# Patient Record
Sex: FEMALE | Race: WHITE | Employment: OTHER | URBAN - METROPOLITAN AREA
[De-identification: names, ages, dates, MRNs, and addresses within clinical notes are randomized per-mention and may not be internally consistent; named-entity substitution may affect disease eponyms.]

---

## 2017-06-22 ENCOUNTER — HOSPITAL ENCOUNTER (OUTPATIENT)
Dept: MRI IMAGING | Facility: CLINIC | Age: 82
Discharge: HOME | End: 2017-06-22
Attending: INTERNAL MEDICINE | Admitting: INTERNAL MEDICINE
Payer: MEDICARE

## 2017-06-22 DIAGNOSIS — C50.919 MALIGNANT NEOPLASM OF FEMALE BREAST, UNSPECIFIED LATERALITY, UNSPECIFIED SITE OF BREAST: ICD-10-CM

## 2017-06-22 LAB
CREAT BLD-MCNC: 0.6 MG/DL (ref 0.52–1.04)
GFR SERPL CREATININE-BSD FRML MDRD: >90 ML/MIN/1.7M2

## 2017-06-22 PROCEDURE — 25000128 H RX IP 250 OP 636: Performed by: INTERNAL MEDICINE

## 2017-06-22 PROCEDURE — 0159T MR BREAST BILATERAL W/O & W CONTRAST: CPT | Mod: GA

## 2017-06-22 PROCEDURE — A9585 GADOBUTROL INJECTION: HCPCS | Performed by: INTERNAL MEDICINE

## 2017-06-22 PROCEDURE — 82565 ASSAY OF CREATININE: CPT

## 2017-06-22 PROCEDURE — 27210995 ZZH RX 272: Performed by: INTERNAL MEDICINE

## 2017-06-22 RX ORDER — ACYCLOVIR 200 MG/1
30 CAPSULE ORAL ONCE
Status: COMPLETED | OUTPATIENT
Start: 2017-06-22 | End: 2017-06-22

## 2017-06-22 RX ORDER — GADOBUTROL 604.72 MG/ML
10 INJECTION INTRAVENOUS ONCE
Status: COMPLETED | OUTPATIENT
Start: 2017-06-22 | End: 2017-06-22

## 2017-06-22 RX ADMIN — GADOBUTROL 10 ML: 604.72 INJECTION INTRAVENOUS at 12:56

## 2017-06-22 RX ADMIN — SODIUM CHLORIDE 30 ML: 9 INJECTION, SOLUTION INTRAMUSCULAR; INTRAVENOUS; SUBCUTANEOUS at 12:56

## 2017-06-26 ENCOUNTER — TELEPHONE (OUTPATIENT)
Dept: MAMMOGRAPHY | Facility: CLINIC | Age: 82
End: 2017-06-26

## 2017-06-26 NOTE — TELEPHONE ENCOUNTER
After review by [facility] Radiologist, Dr. Terry Winters, Ms. Watts was called and given her 6/22/2017 Breast MRI results (BI-RADS 2) and recommended Follow up (Annual Screening).  Ms. Watts denies any questions or concerns at this time.  I encouraged her to perform monthly breast self exams and to contact her doctor with any further breast concerns.

## 2017-09-10 ENCOUNTER — APPOINTMENT (OUTPATIENT)
Dept: GENERAL RADIOLOGY | Facility: CLINIC | Age: 82
End: 2017-09-10
Attending: EMERGENCY MEDICINE
Payer: MEDICARE

## 2017-09-10 ENCOUNTER — HOSPITAL ENCOUNTER (EMERGENCY)
Facility: CLINIC | Age: 82
Discharge: HOME | End: 2017-09-10
Attending: EMERGENCY MEDICINE | Admitting: EMERGENCY MEDICINE
Payer: MEDICARE

## 2017-09-10 VITALS
HEART RATE: 72 BPM | TEMPERATURE: 97.4 F | DIASTOLIC BLOOD PRESSURE: 117 MMHG | HEIGHT: 65 IN | WEIGHT: 145 LBS | BODY MASS INDEX: 24.16 KG/M2 | SYSTOLIC BLOOD PRESSURE: 180 MMHG | OXYGEN SATURATION: 97 % | RESPIRATION RATE: 18 BRPM

## 2017-09-10 DIAGNOSIS — I10 ESSENTIAL HYPERTENSION: ICD-10-CM

## 2017-09-10 DIAGNOSIS — F43.21 ADJUSTMENT DISORDER WITH DEPRESSED MOOD: ICD-10-CM

## 2017-09-10 DIAGNOSIS — R68.84 JAW PAIN: ICD-10-CM

## 2017-09-10 LAB
ANION GAP SERPL CALCULATED.3IONS-SCNC: 6 MMOL/L (ref 3–14)
BASOPHILS # BLD AUTO: 0 10E9/L (ref 0–0.2)
BASOPHILS NFR BLD AUTO: 0.6 %
BUN SERPL-MCNC: 14 MG/DL (ref 7–30)
CALCIUM SERPL-MCNC: 8.9 MG/DL (ref 8.5–10.1)
CHLORIDE SERPL-SCNC: 104 MMOL/L (ref 94–109)
CO2 SERPL-SCNC: 30 MMOL/L (ref 20–32)
CREAT SERPL-MCNC: 0.58 MG/DL (ref 0.52–1.04)
DIFFERENTIAL METHOD BLD: NORMAL
EOSINOPHIL # BLD AUTO: 0 10E9/L (ref 0–0.7)
EOSINOPHIL NFR BLD AUTO: 0.8 %
ERYTHROCYTE [DISTWIDTH] IN BLOOD BY AUTOMATED COUNT: 14.1 % (ref 10–15)
GFR SERPL CREATININE-BSD FRML MDRD: >90 ML/MIN/1.7M2
GLUCOSE SERPL-MCNC: 115 MG/DL (ref 70–99)
HCT VFR BLD AUTO: 42.2 % (ref 35–47)
HGB BLD-MCNC: 14.1 G/DL (ref 11.7–15.7)
IMM GRANULOCYTES # BLD: 0 10E9/L (ref 0–0.4)
IMM GRANULOCYTES NFR BLD: 0.2 %
LYMPHOCYTES # BLD AUTO: 1 10E9/L (ref 0.8–5.3)
LYMPHOCYTES NFR BLD AUTO: 19.4 %
MCH RBC QN AUTO: 28.6 PG (ref 26.5–33)
MCHC RBC AUTO-ENTMCNC: 33.4 G/DL (ref 31.5–36.5)
MCV RBC AUTO: 86 FL (ref 78–100)
MONOCYTES # BLD AUTO: 0.4 10E9/L (ref 0–1.3)
MONOCYTES NFR BLD AUTO: 8.4 %
NEUTROPHILS # BLD AUTO: 3.6 10E9/L (ref 1.6–8.3)
NEUTROPHILS NFR BLD AUTO: 70.6 %
NRBC # BLD AUTO: 0 10*3/UL
NRBC BLD AUTO-RTO: 0 /100
PLATELET # BLD AUTO: 260 10E9/L (ref 150–450)
POTASSIUM SERPL-SCNC: 3.6 MMOL/L (ref 3.4–5.3)
RBC # BLD AUTO: 4.93 10E12/L (ref 3.8–5.2)
SODIUM SERPL-SCNC: 140 MMOL/L (ref 133–144)
TROPONIN I SERPL-MCNC: <0.015 UG/L (ref 0–0.04)
WBC # BLD AUTO: 5.1 10E9/L (ref 4–11)

## 2017-09-10 PROCEDURE — 96375 TX/PRO/DX INJ NEW DRUG ADDON: CPT

## 2017-09-10 PROCEDURE — 85025 COMPLETE CBC W/AUTO DIFF WBC: CPT | Performed by: EMERGENCY MEDICINE

## 2017-09-10 PROCEDURE — 84484 ASSAY OF TROPONIN QUANT: CPT | Performed by: EMERGENCY MEDICINE

## 2017-09-10 PROCEDURE — 80048 BASIC METABOLIC PNL TOTAL CA: CPT | Performed by: EMERGENCY MEDICINE

## 2017-09-10 PROCEDURE — 25000128 H RX IP 250 OP 636: Performed by: EMERGENCY MEDICINE

## 2017-09-10 PROCEDURE — 99284 EMERGENCY DEPT VISIT MOD MDM: CPT | Mod: 25

## 2017-09-10 PROCEDURE — 96374 THER/PROPH/DIAG INJ IV PUSH: CPT

## 2017-09-10 PROCEDURE — 71020 XR CHEST 2 VW: CPT

## 2017-09-10 RX ORDER — HYDRALAZINE HYDROCHLORIDE 20 MG/ML
10 INJECTION INTRAMUSCULAR; INTRAVENOUS ONCE
Status: COMPLETED | OUTPATIENT
Start: 2017-09-10 | End: 2017-09-10

## 2017-09-10 RX ORDER — METOCLOPRAMIDE HYDROCHLORIDE 5 MG/ML
5 INJECTION INTRAMUSCULAR; INTRAVENOUS ONCE
Status: COMPLETED | OUTPATIENT
Start: 2017-09-10 | End: 2017-09-10

## 2017-09-10 RX ORDER — METOCLOPRAMIDE 5 MG/1
5 TABLET ORAL 3 TIMES DAILY PRN
Qty: 20 TABLET | Refills: 0 | Status: SHIPPED | OUTPATIENT
Start: 2017-09-10 | End: 2021-06-04

## 2017-09-10 RX ADMIN — HYDRALAZINE HYDROCHLORIDE 10 MG: 20 INJECTION INTRAMUSCULAR; INTRAVENOUS at 10:26

## 2017-09-10 RX ADMIN — METOCLOPRAMIDE 5 MG: 5 INJECTION, SOLUTION INTRAMUSCULAR; INTRAVENOUS at 11:14

## 2017-09-10 ASSESSMENT — ENCOUNTER SYMPTOMS
FEVER: 0
WOUND: 0
VOMITING: 0
SHORTNESS OF BREATH: 1
ABDOMINAL PAIN: 1
HEMATURIA: 0
DYSURIA: 0
DIARRHEA: 0
NAUSEA: 1
FREQUENCY: 1

## 2017-09-10 NOTE — DISCHARGE INSTRUCTIONS
Grief Reaction  Grief is the feeling that we all have when we lose someone or something that has been important in our life. Grief is an unavoidable and normal reaction to this loss, and can last from months to years. The amount of time depends on different factors. These include how close the person was to you, and how much support you have through the grief process. Symptoms can be both physical and emotional.  Physical reactions:    Loss of appetite or overeating    Changes in weight    Trouble getting to sleep or staying asleep    Hair loss    Upset stomach, indigestion, heart burn, abdominal pain, cramping, diarrhea    Sense of trouble breathing    Trembling, shakiness  Emotional reactions:    Sadness    Anxiety    Feeling depressed or helpless    Difficulty concentrating    Detachment or withdrawal from those around you    Loss of interest in your normal life and work  Home care    Allow yourself to feel the pain of your loss. For some, this can be a key part of healing grief. Talk about your pain with others who understand. Share good memories that involve the person, pet, or possession  you lost.    Take time for yourself. Make it a point to do things that you enjoy (gardening, walking in nature, going to a movie, etc.).    Take care of your physical body. Eat a balanced diet (low in saturated fat and high in fruits and vegetables) and establish an exercise plan at least 3 times a week for 30 minutes. Even mild-moderate exercise (like brisk walking) can make you feel better. Get plenty of sleep.    Avoid the use of alcohol and drugs to cover your emotional pain. This only slows down the emotional healing process.    Do not isolate yourself from others. Have daily contact with family or friends. Talk about your loss to those closest to you.    For additional support, meet with your //rabbi, a counselor or therapist, or your own doctor.    Consider joining a grief support group. Ask your doctor  or our staff for information on how to find one in your area.    If you have been prescribed a medicine to help with your symptoms, take it only as directed. Do not use it with alcohol.  Follow-up care  Follow up with your healthcare provider, or as advised.  Call 911  Call 911 if any of these occur:    Trouble breathing    Very confused    Very drowsy or trouble awakening    Fainting or loss of consciousness    Rapid heart rate    Seizure    New chest pain that becomes more severe, lasts longer, or spreads into your shoulder, arm, neck, jaw or back  When to seek medical advice  Call your healthcare provider right away if any of these occur:    Worsening symptoms    Unable to eat or sleep for three days in a row    Feeling extreme depression, fear, anxiety, or anger toward yourself or others    Feeling out of control    Feeling that you may try to harm yourself    family or friends express concern over your behavior and ask you to get help  Date Last Reviewed: 9/29/2015 2000-2017 [employer]. [address] [place] [address]. All rights reserved. This information is not intended as a substitute for professional medical care. Always follow your healthcare professional's instructions.          Acute Pain, Uncertain Cause  Pain can be caused by many conditions that range from very minor to very serious. In some cases, though, pain comes and goes with no apparent cause.  We were not able to find the exact cause for your pain. At this time there is no sign of any serious illness causing your pain. More tests may be needed to determine the cause. In many cases, pain like this goes away by itself.  Home care  Take any medicines as prescribed. If another medicine was not prescribed for pain, you can take an over-the-counter pain medicine such as ibuprofen or acetaminophen. Use these as directed on the label.    Follow-up care  Follow up with your healthcare provider or our staff as directed.  When  to seek medical advice  Call your healthcare provider for any of the following:    Pain changes in pattern    Pain doesn't lessen or gets worse    New symptoms appear    Fever of 100.4 F (38 C) or higher, or as directed by your healthcare provider  Date Last Reviewed: 7/26/2015 2000-2017 [employer]. [address] [place] [address], [place] [address]. All rights reserved. This information is not intended as a substitute for professional medical care. Always follow your healthcare professional's instructions.

## 2017-09-10 NOTE — ED AVS SNAPSHOT
Emergency Department    [address] [facility] [address]    Phone:  [phone]    Fax:  [phone]                                       Nelda Watts   MRN: [id number]    Department:   Emergency Department   Date of Visit:  9/10/2017           Patient Information     Date Of Birth          1/29/1933        Your diagnoses for this visit were:     Jaw pain     Adjustment disorder with depressed mood     Essential hypertension        You were seen by Puneet Feliciano MD.      Follow-up Information     Follow up with Ryland Torres MD In 1 day.    Specialty:  Internal Medicine    Contact information:    [facility]  [address]  [facility] [address]  [phone]          Follow up with  Emergency Department Today.    Specialty:  EMERGENCY MEDICINE    Why:  If symptoms worsen    Contact information:    [address] [facility] [id number] [phone]        Discharge Instructions         Grief Reaction  Grief is the feeling that we all have when we lose someone or something that has been important in our life. Grief is an unavoidable and normal reaction to this loss, and can last from months to years. The amount of time depends on different factors. These include how close the person was to you, and how much support you have through the grief process. Symptoms can be both physical and emotional.  Physical reactions:    Loss of appetite or overeating    Changes in weight    Trouble getting to sleep or staying asleep    Hair loss    Upset stomach, indigestion, heart burn, abdominal pain, cramping, diarrhea    Sense of trouble breathing    Trembling, shakiness  Emotional reactions:    Sadness    Anxiety    Feeling depressed or helpless    Difficulty concentrating    Detachment or withdrawal from those around you    Loss of interest in your normal life and work  Home care    Allow yourself to feel the pain of your loss. For some, this can be a key part of  healing grief. Talk about your pain with others who understand. Share good memories that involve the person, pet, or possession  you lost.    Take time for yourself. Make it a point to do things that you enjoy (gardening, walking in nature, going to a movie, etc.).    Take care of your physical body. Eat a balanced diet (low in saturated fat and high in fruits and vegetables) and establish an exercise plan at least 3 times a week for 30 minutes. Even mild-moderate exercise (like brisk walking) can make you feel better. Get plenty of sleep.    Avoid the use of alcohol and drugs to cover your emotional pain. This only slows down the emotional healing process.    Do not isolate yourself from others. Have daily contact with family or friends. Talk about your loss to those closest to you.    For additional support, meet with your //rabbi, a counselor or therapist, or your own doctor.    Consider joining a grief support group. Ask your doctor or our staff for information on how to find one in your area.    If you have been prescribed a medicine to help with your symptoms, take it only as directed. Do not use it with alcohol.  Follow-up care  Follow up with your healthcare provider, or as advised.  Call 911  Call 911 if any of these occur:    Trouble breathing    Very confused    Very drowsy or trouble awakening    Fainting or loss of consciousness    Rapid heart rate    Seizure    New chest pain that becomes more severe, lasts longer, or spreads into your shoulder, arm, neck, jaw or back  When to seek medical advice  Call your healthcare provider right away if any of these occur:    Worsening symptoms    Unable to eat or sleep for three days in a row    Feeling extreme depression, fear, anxiety, or anger toward yourself or others    Feeling out of control    Feeling that you may try to harm yourself    family or friends express concern over your behavior and ask you to get help  Date Last Reviewed:  9/29/2015 2000-2017 [employer]. [address] [place] [address]. All rights reserved. This information is not intended as a substitute for professional medical care. Always follow your healthcare professional's instructions.          Acute Pain, Uncertain Cause  Pain can be caused by many conditions that range from very minor to very serious. In some cases, though, pain comes and goes with no apparent cause.  We were not able to find the exact cause for your pain. At this time there is no sign of any serious illness causing your pain. More tests may be needed to determine the cause. In many cases, pain like this goes away by itself.  Home care  Take any medicines as prescribed. If another medicine was not prescribed for pain, you can take an over-the-counter pain medicine such as ibuprofen or acetaminophen. Use these as directed on the label.    Follow-up care  Follow up with your healthcare provider or our staff as directed.  When to seek medical advice  Call your healthcare provider for any of the following:    Pain changes in pattern    Pain doesn't lessen or gets worse    New symptoms appear    Fever of 100.4 F (38 C) or higher, or as directed by your healthcare provider  Date Last Reviewed: 7/26/2015 2000-2017 [employer]. [address] [place] [address]. All rights reserved. This information is not intended as a substitute for professional medical care. Always follow your healthcare professional's instructions.          24 Hour Appointment Hotline       To make an appointment at any [facility], call [phone] ([phone]). If you don't have a family doctor or clinic, we will help you find one. [facility] are conveniently located to serve the needs of you and your family.             Review of your medicines      START taking        Dose / Directions Last dose taken    metoclopramide 5 MG tablet   Commonly known as:  REGLAN   Dose:  5 mg    Quantity:  20 tablet        Take 1 tablet (5 mg) by mouth 3 times daily as needed   Refills:  0          Our records show that you are taking the medicines listed below. If these are incorrect, please call your family doctor or clinic.        Dose / Directions Last dose taken    acetaminophen 650 MG 8 hour tablet   Dose:  650 mg   Quantity:  100 tablet        Take 650 mg by mouth every 6 hours   Refills:  0        ALLEGRA PO   Dose:  60 mg        Take 60 mg by mouth daily Breaks tab in 1/2 and takes 1/2 tab twice daily of single daily dose tab.   Refills:  0        B-12 PO   Dose:  1000 mg        Take 1,000 mg by mouth every evening Patient does not know strength   Refills:  0        calcium-vitamin D 600-400 MG-UNIT per tablet   Commonly known as:  CALTRATE   Dose:  1 tablet        Take 1 tablet by mouth 2 times daily   Refills:  0        CLONAZEPAM PO   Dose:  0.5 mg        Take 0.5 mg by mouth daily as needed for anxiety   Refills:  0        COENZYME Q-10 PO   Dose:  200 mg        Take 200 mg by mouth daily   Refills:  0        COUMADIN PO   Dose:  4 mg        Take 4 mg by mouth daily   Refills:  0        cyanocobalamin 1000 MCG/ML injection   Commonly known as:  VITAMIN B12   Dose:  1 mL        Inject 1 mL into the muscle every 30 days Patient receives on the first week of each month   Refills:  0        LACTOBACILLUS PO   Dose:  1 capsule        Take 1 capsule by mouth 2 times daily   Refills:  0        MAGNESIUM OXIDE PO   Dose:  500 mg        Take 500 mg by mouth At Bedtime   Refills:  0        MELATONIN PO   Dose:  3 mg        Take 3 mg by mouth At Bedtime   Refills:  0        Multi-vitamin Tabs tablet   Dose:  1 tablet        Take 1 tablet by mouth daily   Refills:  0        OMEPRAZOLE PO   Dose:  20 mg        Take 20 mg by mouth 2 times daily (before meals)   Refills:  0        oxyCODONE 5 MG IR tablet   Commonly known as:  ROXICODONE   Dose:  5 mg   Quantity:  100 tablet        Take 1 tablet (5 mg) by  mouth every 3 hours as needed for moderate to severe pain   Refills:  0        prenatal multivitamin plus iron 27-0.8 MG Tabs per tablet   Dose:  1 tablet        Take 1 tablet by mouth daily   Refills:  0        PROPRANOLOL HCL PO   Dose:  40 mg        Take 40 mg by mouth 4 times daily   Refills:  0        sennosides 8.6 MG tablet   Commonly known as:  SENOKOT   Dose:  1 tablet   Indication:  Constipation        Take 1 tablet by mouth 2 times daily   Refills:  0        ZOFRAN PO   Dose:  4 mg        Take 4 mg by mouth every 6 hours as needed for nausea or vomiting   Refills:  0                Prescriptions were sent or printed at these locations (1 Prescription)                   Other Prescriptions                Printed at Department/Unit printer (1 of 1)         metoclopramide (REGLAN) 5 MG tablet                Procedures and tests performed during your visit     Basic metabolic panel    CBC with platelets differential    Chest XR,  PA & LAT    Troponin I      Orders Needing Specimen Collection     None      Pending Results     No orders found from 9/8/2017 to 9/11/2017.            Pending Culture Results     No orders found from 9/8/2017 to 9/11/2017.            Pending Results Instructions     If you had any lab results that were not finalized at the time of your Discharge, you can call the ED Lab Result RN at [phone]. You will be contacted by this team for any positive Lab results or changes in treatment. The nurses are available 7 days a week from 10A to 6:30P.  You can leave a message 24 hours per day and they will return your call.        Test Results From Your Hospital Stay        9/10/2017  9:51 AM      Component Results     Component Value Ref Range & Units Status    WBC 5.1 4.0 - 11.0 10e9/L Final    RBC Count 4.93 3.8 - 5.2 10e12/L Final    Hemoglobin 14.1 11.7 - 15.7 g/dL Final    Hematocrit 42.2 35.0 - 47.0 % Final    MCV 86 78 - 100 fl Final    MCH 28.6 26.5 - 33.0 pg Final    MCHC 33.4 31.5 -  36.5 g/dL Final    RDW 14.1 10.0 - 15.0 % Final    Platelet Count 260 150 - 450 10e9/L Final    Diff Method Automated Method  Final    % Neutrophils 70.6 % Final    % Lymphocytes 19.4 % Final    % Monocytes 8.4 % Final    % Eosinophils 0.8 % Final    % Basophils 0.6 % Final    % Immature Granulocytes 0.2 % Final    Nucleated RBCs 0 0 /100 Final    Absolute Neutrophil 3.6 1.6 - 8.3 10e9/L Final    Absolute Lymphocytes 1.0 0.8 - 5.3 10e9/L Final    Absolute Monocytes 0.4 0.0 - 1.3 10e9/L Final    Absolute Eosinophils 0.0 0.0 - 0.7 10e9/L Final    Absolute Basophils 0.0 0.0 - 0.2 10e9/L Final    Abs Immature Granulocytes 0.0 0 - 0.4 10e9/L Final    Absolute Nucleated RBC 0.0  Final         9/10/2017 10:05 AM      Component Results     Component Value Ref Range & Units Status    Sodium 140 133 - 144 mmol/L Final    Potassium 3.6 3.4 - 5.3 mmol/L Final    Chloride 104 94 - 109 mmol/L Final    Carbon Dioxide 30 20 - 32 mmol/L Final    Anion Gap 6 3 - 14 mmol/L Final    Glucose 115 (H) 70 - 99 mg/dL Final    Urea Nitrogen 14 7 - 30 mg/dL Final    Creatinine 0.58 0.52 - 1.04 mg/dL Final    GFR Estimate >90 >60 mL/min/1.7m2 Final    Non  GFR Calc    GFR Estimate If Black >90 >60 mL/min/1.7m2 Final    African American GFR Calc    Calcium 8.9 8.5 - 10.1 mg/dL Final         9/10/2017 10:09 AM      Component Results     Component Value Ref Range & Units Status    Troponin I ES <0.015 0.000 - 0.045 ug/L Final    The 99th percentile for upper reference range is 0.045 ug/L.  Troponin values   in the range of 0.045 - 0.120 ug/L may be associated with risks of adverse   clinical events.           9/10/2017 10:31 AM      Narrative     CHEST TWO VIEWS  9/10/2017 10:23 AM     HISTORY: Chest pain.    COMPARISON: None.        Impression     IMPRESSION: No significant interval change. Hyperinflation both lungs.  The lungs are otherwise clear. Cardiac enlargement. Pulmonary  vascularity is within normal limits. Aortic  calcification. No pleural  effusions. Left shoulder arthroplasty.    FARIDA VELAZQUEZ MD                Clinical Quality Measure: Blood Pressure Screening     Your blood pressure was checked while you were in the emergency department today. The last reading we obtained was  BP: (!) 159/103 . Please read the guidelines below about what these numbers mean and what you should do about them.  If your systolic blood pressure (the top number) is less than 120 and your diastolic blood pressure (the bottom number) is less than 80, then your blood pressure is normal. There is nothing more that you need to do about it.  If your systolic blood pressure (the top number) is 120-139 or your diastolic blood pressure (the bottom number) is 80-89, your blood pressure may be higher than it should be. You should have your blood pressure rechecked within a year by a primary care provider.  If your systolic blood pressure (the top number) is 140 or greater or your diastolic blood pressure (the bottom number) is 90 or greater, you may have high blood pressure. High blood pressure is treatable, but if left untreated over time it can put you at risk for heart attack, stroke, or kidney failure. You should have your blood pressure rechecked by a primary care provider within the next 4 weeks.  If your provider in the emergency department today gave you specific instructions to follow-up with your doctor or provider even sooner than that, you should follow that instruction and not wait for up to 4 weeks for your follow-up visit.        Thank you for choosing [facility]       Thank you for choosing [facility] for your care. Our goal is always to provide you with excellent care. Hearing back from our patients is one way we can continue to improve our services. Please take a few minutes to complete the written survey that you may receive in the mail after you visit with us. Thank you!        [facility] [facility] Information     [email or web address] lets you send messages to  "your doctor, view your test results, renew your prescriptions, schedule appointments and more. To sign up, go to [email or web address] . Click on \"Log in\" on the left side of the screen, which will take you to the Welcome page. Then click on \"Sign up Now\" on the right side of the page.     You will be asked to enter the access code listed below, as well as some personal information. Please follow the directions to create your username and password.     Your access code is: [email or web address]  Expires: 2017 11:31 AM     Your access code will  in 90 days. If you need help or a new code, please call your [facility] clinic or [phone].        Care EveryWhere ID     This is your Care EveryWhere ID. This could be used by other organizations to access your [facility] medical records  [id number]        Equal Access to Services     AUBREY CRAMER : Hadii martha Ayala, wapage bauer, geremias kaalmatristan lewis, aram arias . So [facility] [phone].    ATENCIÓN: Si habla español, tiene a albright disposición servicios gratuitos de asistencia lingüística. Llame al [phone].    We comply with applicable federal civil rights laws and [place] laws. We do not discriminate on the basis of race, color, national origin, age, disability sex, sexual orientation or gender identity.            After Visit Summary       This is your record. Keep this with you and show to your community pharmacist(s) and doctor(s) at your next visit.                  "

## 2017-09-10 NOTE — ED AVS SNAPSHOT
Emergency Department    [address] [place] [address]    Phone:  [phone]    Fax:  [phone]                                       Nelda Watts   MRN: [id number]    Department:   Emergency Department   Date of Visit:  9/10/2017           After Visit Summary Signature Page     I have received my discharge instructions, and my questions have been answered. I have discussed any challenges I see with this plan with the nurse or doctor.    ..........................................................................................................................................  Patient/Patient Representative Signature      ..........................................................................................................................................  Patient Representative Print Name and Relationship to Patient    ..................................................               ................................................  Date                                            Time    ..........................................................................................................................................  Reviewed by Signature/Title    ...................................................              ..............................................  Date                                                            Time

## 2017-09-10 NOTE — ED PROVIDER NOTES
History     Chief Complaint:  Jaw Pain and Nausea      HPI   Nelda Watts is a 84 year old female with a history of Strokes, Atrial Fibrillation Hypertension and Gastroparesis who presents with jaw pain and nausea. The patient reports that on Thursday she was seen at [facility] and was treated for a UTI with 500 mg of Keflex because of her symptoms of frequency and nausea. She reports that her symptoms of nausea (not associated with any food) and slight shortness of breath that is more severe than her baseline both began on Thursday (3 days ago).  She reports that she has been feeling intermittent left-sided jaw pain that radiates into her ear (not into her head or neck) since yesterday that comes on 3x/hour and only lasts for seconds. She states that she was in bed when the pain initially came on and took Tylenol for the pain but experienced no relief. She additionally notes slight abdominal pain, not being able to sleep well, chronic energy loss and dry mouth that comes from the medications that she takes. The patient's daughter reports that the patient's blood pressure is typically 138/95 at home. The patient denies chest pain, swelling in her legs, vomiting, fever, or any recent trauma. She states that she was at the dentist two weeks ago and had a filling in an upper-left tooth but has not had problems since the dental visit. She also denies bowel or urinary problems (aside from frequency, urinates every 30 minutes). Of note, the patient's  recently  in  (2 months ago) but lives at [facility] with other elderly people. Also of note, the patient ambulates with a walker at baseline. Of note, the patient had stopped taking Neurontin until recently, just starting back over the last one or two days because of a miscommunication between her PCP and her Orthopedic doctor.    Allergies:  Anastrozole  Citalopram  Clonidine  Contrast Dye  Hydralazine  Nifedipine  Sulfa  drugs  Tetanus immune globulin  Vasotec    Medications:    Neurontin  Coumadin  Senokot  Zofran  Roxicodone  Acetaminophen  Lactobacillus  Prenatal multivitamin  Coenzyme Q-10  Caltrate  Clonazepam  Cyanocobalamin  Allegra  Magnesium oxide  Melatonin  Omeprazole  Propranolol    Past Medical History:    Chronic atrial fibrillation  Cerebrovascular disease  Right knee DJD  Nausea and vomiting  GI Bleed  Diverticulitis  Iamloc prim osteoart- left leg  Localized osteoarthritis, shoulder region  Anxiety and depression  Arrhythmia  Blastocystis hominis infection  Cholelithiasis  Cysts  Ductal carcinoma  Essential tremor (neck)  Gastro Esophageal Reflux Disease  Hypertension  Hypothyroidism  Impaired fasting glucose  Myalgia and myositis, unspecified  Scoliosis  Stroke  Suprasellar mass  Unspecified diastolic heart failure  Uterine fibroid  Vitamin B12 deficiency  Breast cancer    Past Surgical History:    Appendectomy  Arthroplasty knee  Biopsy- breast, muscle  Breast surgery- right lumpectomy 20 Dec 2013  Hematoma evacuation  GI surgery  Salpingectomy  Bunionectomy  Cyst removal from tow  Vein stripping    Family History:    History reviewed. No significant family history.     Social History:  Relationship status:  (recent)  Tobacco Use: Neg  Alcohol Use: Pos (3.5 oz/week)  The patient presents with her daughter.  The patient is retired.  The patient uses a walker to ambulate.   The patient lives in a living facility.    Review of Systems   Constitutional: Negative for fever.   HENT:        Positive for jaw pain.   Respiratory: Positive for shortness of breath.    Cardiovascular: Negative for chest pain and leg swelling.   Gastrointestinal: Positive for abdominal pain and nausea. Negative for diarrhea and vomiting.   Genitourinary: Positive for frequency. Negative for dysuria, hematuria and urgency.   Skin: Negative for wound.   All other systems reviewed and are negative.    Physical Exam     Patient Vitals for  "the past 24 hrs:   BP Temp Temp src Pulse Heart Rate Resp SpO2 Height Weight   09/10/17 1159 - - - - - 18 - - -   09/10/17 1130 (!) 180/117 - - - 84 13 97 % - -   09/10/17 1115 (!) 169/111 - - - 67 13 97 % - -   09/10/17 1100 (!) 159/103 - - - - - - - -   09/10/17 1045 158/81 - - - - - - - -   09/10/17 1030 (!) 163/118 - - - - - - - -   09/10/17 1027 (!) 187/117 - - - - - - - -   09/10/17 0931 (!) 202/134 - - - - - 96 % - -   09/10/17 0920 (!) 200/124 97.4  F (36.3  C) Oral 72 72 16 96 % 1.651 m (5' 5\") 65.8 kg (145 lb)     Physical Exam  General: Alert and Interactive.   Head: No signs of trauma.   Mouth/Throat: Oropharynx is clear and moist.   Eyes: Conjunctivae are normal. Pupils are equal, round, and reactive to light.   Neck: Normal range of motion. No nuchal rigidity. Tremor present.  CV: Irregularly irregular.  Resp: Effort normal and breath sounds normal. No respiratory distress.   GI: Soft. There is no tenderness or guarding.   MSK: Normal range of motion. no edema.   Neuro: The patient is alert and oriented to person, place, and time.  PERRLA, EOMI, strength in upper/lower extremities normal and symmetrical.   Sensation normal. Speech normal.  GCS eye subscore is 4. GCS verbal subscore is 5. GCS motor subscore is 6.   Skin: Skin is warm and dry. No rash noted.   Psych: normal mood and affect. behavior is normal.     Emergency Department Course   ECG @ 0921  Indication: Jaw Pain and Nausea  Rate 76 bpm.   ID interval * ms.   QRS duration 104 ms.   QT/QTc 416/468 ms.   P-R-T axes -41.  Notes: Atrial Fibrillation, Left axis deviation, Voltage criteria for left ventricular hypertrophy, Anteroseptal infarct- age undetermined, Abnormal ECG. Unchanged compared to ECG done 12/5/2014  Time read 0925     Imaging:  Radiographic findings were communicated with the patient and family who voiced understanding of the findings.    Chest XR, PA & LAT per radiology:  No significant interval change. Hyperinflation both " "lungs. The lungs are otherwise clear. Cardiac enlargement. Pulmonary vascularity is within normal limits. Aortic calcification. No pleural effusions. Left shoulder arthroplasty.    Laboratory:  CBC: WNL (WBC 5.1, HGB 14.1, )   BMP: Glucose 115 (H) o/w WNL (Creatinine 0.58)   0930: Troponin I: <0.015    Interventions:  1026: Apresoline, 10 mg, IV  1114: Reglan, 5 mg, IV    Emergency Department Course:  Nursing notes and vitals reviewed.  I performed an exam of the patient as documented above.  The above workup was undertaken.  1100: I rechecked the patient and discussed results.  Findings and plan explained to the Patient and her daughter. Patient discharged home, status improved, with instructions regarding supportive care, medications, and reasons to return as well as the importance of close follow-up was reviewed.    Impression & Plan    Medical Decision Making:  Nelda Watts is a 84 year old female who presents to the ER with a complicated medical history and a recent grief reaction, it sounds as though she is having some adjustment disorder-type symptoms related to the grief of losing her  after being  for 65 years.   The patient does not eat, she is experiencing pain in her jaw which sounds to be more consistent with trigeminal neuralgia than with cardiac disease or trauma. The patient is significantly hypertensive but she is anxious. She is depressed because of her recent loss and she states she wishes she could die but she would \"never do that to her children\". The patient would qualify for a 72 hour hold. She is comfortable going home, she would like to go home but she knows she has to work through some of her grief before things will get better. The patient had stopped taking Neurontin until recently just starting back over the last one or two days, this may be contributing to the nerve-like pain in the jaw. She did complain of nausea for which she was treated with Reglan here, it " did seem to improve her symptoms so she will be given a small amount of the medication to be used as a PRN at home.    Diagnosis:    ICD-10-CM   1. Jaw pain R68.84   2. Adjustment disorder with depressed mood F43.21   3. Essential hypertension I10     Disposition:  Discharged to home with Reglan.    Discharge Medications:  metoclopramide (REGLAN) 5 MG tablet Take 1 tablet (5 mg) by mouth 3 times daily as needed, Disp-20 tablet, R-0, Local Print     I, Patricia Mendiola, am serving as a scribe on 9/10/2017 at 9:36 AM to personally document services performed by Puneet Feliciano MD, based on my observations and the provider's statements to me.     EMERGENCY DEPARTMENT       Puneet Feliciano MD  09/10/17 0339

## 2017-12-11 ENCOUNTER — HOSPITAL ENCOUNTER (OUTPATIENT)
Dept: GENERAL RADIOLOGY | Facility: CLINIC | Age: 82
Discharge: HOME | End: 2017-12-11
Attending: INTERNAL MEDICINE | Admitting: INTERNAL MEDICINE
Payer: MEDICARE

## 2017-12-11 DIAGNOSIS — R11.0 NAUSEA: ICD-10-CM

## 2017-12-11 PROCEDURE — 25500045 ZZH RX 255: Performed by: RADIOLOGY

## 2017-12-11 PROCEDURE — 74240 X-RAY XM UPR GI TRC 1CNTRST: CPT

## 2017-12-11 RX ADMIN — ANTACID/ANTIFLATULENT 4 G: 380; 550; 10; 10 GRANULE, EFFERVESCENT ORAL at 10:55

## 2018-01-24 ENCOUNTER — HOSPITAL ENCOUNTER (OUTPATIENT)
Dept: MAMMOGRAPHY | Facility: CLINIC | Age: 83
Discharge: HOME | End: 2018-01-24
Attending: INTERNAL MEDICINE | Admitting: INTERNAL MEDICINE
Payer: MEDICARE

## 2018-01-24 DIAGNOSIS — Z12.31 VISIT FOR SCREENING MAMMOGRAM: ICD-10-CM

## 2018-01-24 PROCEDURE — 77067 SCR MAMMO BI INCL CAD: CPT

## 2018-07-12 ENCOUNTER — OFFICE VISIT (OUTPATIENT)
Dept: FAMILY MEDICINE | Facility: CLINIC | Age: 83
End: 2018-07-12
Payer: COMMERCIAL

## 2018-07-12 ENCOUNTER — TELEPHONE (OUTPATIENT)
Dept: FAMILY MEDICINE | Facility: CLINIC | Age: 83
End: 2018-07-12

## 2018-07-12 VITALS — OXYGEN SATURATION: 97 % | DIASTOLIC BLOOD PRESSURE: 134 MMHG | SYSTOLIC BLOOD PRESSURE: 221 MMHG | HEART RATE: 77 BPM

## 2018-07-12 DIAGNOSIS — Z85.820 HISTORY OF MELANOMA: Primary | ICD-10-CM

## 2018-07-12 DIAGNOSIS — D22.9 MULTIPLE NEVI: ICD-10-CM

## 2018-07-12 DIAGNOSIS — L81.4 LENTIGINES: ICD-10-CM

## 2018-07-12 DIAGNOSIS — Z85.828 HISTORY OF BASAL CELL CARCINOMA: ICD-10-CM

## 2018-07-12 DIAGNOSIS — D18.01 CHERRY ANGIOMA: ICD-10-CM

## 2018-07-12 DIAGNOSIS — L82.1 SEBORRHEIC KERATOSES: ICD-10-CM

## 2018-07-12 DIAGNOSIS — L82.0 INFLAMED SEBORRHEIC KERATOSIS: ICD-10-CM

## 2018-07-12 PROCEDURE — 17110 DESTRUCTION B9 LES UP TO 14: CPT | Performed by: PHYSICIAN ASSISTANT

## 2018-07-12 PROCEDURE — 99214 OFFICE O/P EST MOD 30 MIN: CPT | Mod: 25 | Performed by: PHYSICIAN ASSISTANT

## 2018-07-12 NOTE — NURSING NOTE
Emerald Velasco MD - 05/17/2018 8:23 AM CDT  Medical records received from 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

## 2018-07-12 NOTE — LETTER
7/12/2018         RE: Nelda Watts  [address]  [place] [address]        Dear Colleague,    Thank you for referring your patient, Nelda Watts, to the [facility]. Please see a copy of my visit note below.    HPI:  Nelda Watts is a 85 year old year old female patient here today for FBE. History of LM, BCC, atypical nevi. Has a new spot on left arm   Duration: months  Symptoms:  Growing and itching     Previous treatments: none     Alleviating/aggravating factors: none    Associated symptoms: none  Additional findings:none  Patient has no other skin complaints today.  Remainder of the HPI, Meds, PMH, Allergies, FH, and SH was reviewed in chart.      Past Medical History:   Diagnosis Date     Anxiety and depression      Arrhythmia     AF     Atrial fibrillation (H)      Blastocystis hominis infection      Cholelithiasis      Cysts     in liver     Diverticulitis      Ductal carcinoma (H)     infiltrating ductal carcinoma right breast     Essential tremor      Gastro-oesophageal reflux disease      Hypertension      Hypothyroidism (acquired)      IFG (impaired fasting glucose)      Myalgia and myositis, unspecified      Nausea     chronic     OA (osteoarthritis)      Scoliosis      Stroke (H) april 2011    has had two strokes, last in april 2011, in which she started on COUMADON     Suprasellar mass      Unspecified diastolic heart failure      Uterine fibroid      Vitamin B12 deficiency        Past Surgical History:   Procedure Laterality Date     APPENDECTOMY       ARTHROPLASTY KNEE Right 9/28/2015    Procedure: ARTHROPLASTY KNEE;  Surgeon: Lucius Smith MD;  Location: [facility] OR     BIOPSY  1956,1997    breast     BIOPSY  1999    muscle     BREAST SURGERY      right lumpectomy 20 DEC 13     BREAST SURGERY Right     hematoma evacuation     GI SURGERY       GYN SURGERY Bilateral     salpingectomy     GYN SURGERY  1963    hysterectomy     ORTHOPEDIC SURGERY       bunionectomy     ORTHOPEDIC SURGERY      cyst removal from toe     VASCULAR SURGERY  1980,2004    vein stripping        No family history on file.    Social History     Social History     Marital status:      Spouse name: N/A     Number of children: N/A     Years of education: N/A     Occupational History     Not on file.     Social History Main Topics     Smoking status: Never Smoker     Smokeless tobacco: Never Used     Alcohol use 3.5 oz/week     7 Glasses of wine per week     Drug use: No     Sexual activity: Not on file     Other Topics Concern     Not on file     Social History Narrative       Outpatient Encounter Prescriptions as of 7/12/2018   Medication Sig Dispense Refill     acetaminophen 650 MG TABS Take 650 mg by mouth every 6 hours 100 tablet      calcium-vitamin D (CALTRATE) 600-400 MG-UNIT per tablet Take 1 tablet by mouth 2 times daily        CLONAZEPAM PO Take 0.5 mg by mouth daily as needed for anxiety       COENZYME Q-10 PO Take 200 mg by mouth daily       Cyanocobalamin (B-12 PO) Take 1,000 mg by mouth every evening Patient does not know strength       cyanocobalamin 1000 MCG/ML injection Inject 1 mL into the muscle every 30 days Patient receives on the first week of each month       Fexofenadine HCl (ALLEGRA PO) Take 60 mg by mouth daily Breaks tab in 1/2 and takes 1/2 tab twice daily of single daily dose tab.       LACTOBACILLUS PO Take 1 capsule by mouth 2 times daily       MAGNESIUM OXIDE PO Take 500 mg by mouth At Bedtime        MELATONIN PO Take 3 mg by mouth At Bedtime        metoclopramide (REGLAN) 5 MG tablet Take 1 tablet (5 mg) by mouth 3 times daily as needed 20 tablet 0     multivitamin, therapeutic with minerals (MULTI-VITAMIN) TABS Take 1 tablet by mouth daily       OMEPRAZOLE PO Take 20 mg by mouth 2 times daily (before meals)       Ondansetron HCl (ZOFRAN PO) Take 4 mg by mouth every 6 hours as needed for nausea or vomiting       oxyCODONE (ROXICODONE) 5 MG immediate  release tablet Take 1 tablet (5 mg) by mouth every 3 hours as needed for moderate to severe pain 100 tablet 0     Prenatal Vit-Fe Fumarate-FA (PRENATAL MULTIVITAMIN  PLUS IRON) 27-0.8 MG TABS Take 1 tablet by mouth daily       PROPRANOLOL HCL PO Take 40 mg by mouth 4 times daily        sennosides (SENOKOT) 8.6 MG tablet Take 1 tablet by mouth 2 times daily       Warfarin Sodium (COUMADIN PO) Take 4 mg by mouth daily        No facility-administered encounter medications on file as of 7/12/2018.        Review Of Systems:  Skin: As above  Eyes: negative  Ears/Nose/Throat: negative  Respiratory: No shortness of breath, dyspnea on exertion, cough, or hemoptysis  Cardiovascular: negative  Gastrointestinal: negative  Genitourinary: negative  Musculoskeletal: negative  Neurologic: negative  Psychiatric: negative  Hematologic/Lymphatic/Immunologic: negative  Endocrine: negative      Objective:     BP (!) 221/134  Pulse 77  SpO2 97%  Eyes: Conjunctivae/lids: Normal   ENT: Lips:  Normal  MSK: Normal  Cardiovascular: Peripheral edema none  Pulm: Breathing Normal  Neuro/Psych: Orientation: Normal; Mood/Affect: Normal, NAD, WDWN  Following areas examined: Lymph Nodes: No Head and Neck Lymphadenopathy   Following lymph nodes palpated: Occipital, Cervical, Supraclavicular and axillae  Scalp, face, eyelids, lips, neck, chest, abdomen, back, buttocks, and R&L upper and lower extremities.      Elkins skin type:ll   Findings:    1) Cherry angiomas on trunk and extremities.  2) Seborrheic keratoses on trunk and extremities.  3) Benign nevi on trunk and extremities.  4) Lentigines on face, neck, trunk, and extremities.  5) Inflamed brown, stuck-on scaly appearing papules on left arm and left inframammary fold.           Assessment and Plan:  1) Red smooth well-defined macules on trunk and extremities.  2) Brown, stuck-on scaly appearing papules on trunk and extremities.  3) Well circumscribed macules with symmetric color  distribution on trunk and extremities.  4) Vences WD smooth macules on face, neck, trunk, and extremities.  I discussed the specifics of tumor, prognosis, and genetics of benign lesions.  I explained that treatment of these lesions would be purely cosmetic and not medically neccessary.  I discussed with patient different removal options including excision, cryotherapy, cautery and /or laser.      5) ISK x 2  LN2: Treated with LN2 for 5s for 1-2 cycles. Warned risks of blistering, pain, pigment change, scarring, and incomplete resolution.  Advised patient to return if lesions do not completely resolve within 2-3 months.  Wound care sheet given.  6) personal hx of LM on right upper back and BCC thigh and ankle  Signs and Symptoms of non-melanoma skin cancer and ABCDEs of melanoma reviewed with patient. Patient encouraged to perform monthly self skin exams and educated on how to perform them. UV precautions reviewed with patient. Patient was asked about new or changing moles/lesions on body.   No signs of recurrence.   7) significantly elevated blood pressure  Urgency/emergency  Pt defers going to UC. States she will go back home (senior housing) and touch base with the nurse there. States her blood pressure always increases at OV and will return to 130/80 when she gets home. I highly encouraged pt to go to ED/UC as blood pressure this high can increase risk for end organ damage, stroke, MI, etc.   Patient to follow up with Primary Care provider regarding elevated blood pressure.    Follow up in 6 months      Again, thank you for allowing me to participate in the care of your patient.        Sincerely,        Dee Diana PA-C

## 2018-07-12 NOTE — PATIENT INSTRUCTIONS
Proper skin care from [facility]:    -Eliminate harsh soaps as they strip the natural oils from the skin, often resulting in dry itchy skin ( i.e. Dial, Zest, Abimbola Spring)  -Use mild soaps such as Cetaphil or Dove Sensitive Skin in the shower. You do not need to use soap on arms, legs, and trunk every time you shower unless visibly soiled.   -Avoid hot or cold showers.  -After showering, lightly dry off and apply moisturizing within 2-3 minutes. This will help trap moisture in the skin.   -Aggressive use of a moisturizer at least 1-2 times a day to the entire body (including -Vanicream, Cetaphil, Aquaphor or Cerave) and moisturize hands after every washing.  -We recommend using moisturizers that come in a tub that needs to be scooped out, not a pump. This has more of an oil base. It will hold moisture in your skin much better than a water base moisturizer. The above recommended are non-pore clogging.           Wear a sunscreen with at least SPF 30 on your face, ears, neck and V of the chest daily. Wear sunscreen on other areas of the body if those areas are exposed to the sun throughout the day. Sunscreens can contain physical and/or chemical blockers. Physical blockers are less likely to clog pores, these include zinc oxide and titanium dioxide. Reapply every two hour and after swimming. Sunscreen examples include Neutrogena, CeraVe, Blue Lizard, Elta MD and many others.    UV radiation  UVA radiation remains constant throughout the day and throughout the year. It is a longer wavelength than UVB and therefore penetrates deeper into the skin leading to immediate and delayed tanning, photoaging, and skin cancer. 70-80% of UVA and UVB radiation occurs between the hours of 10am-2pm.  UVB radiation  UVB radiation causes the most harmful effects and is more significant during the summer months. However, snow and ice can reflect UVB radiation leading to skin damage during the winter months as well. UVB  radiation is responsible for tanning, burning, inflammation, delayed erythema (pinkness), pigmentation (brown spots), and skin cancer.   Just because you do not burn or are not developing a tan does not mean that you are not damaging your skin. A 15 minute drive to and from work for 30 years an lead to chronic sun damage of the skin. It is important to wear a broad spectrum (both UVA and UVB) sunscreen EVERY day with at least 30 SPF. Apply to face, ears, neck and v of the chest as this is where most of our sun exposure is. Reapply sunscreen every two hours if you plan on being outside.   Manish Monae. Clinical Dermatology: A Color Guide to Diagnosis and Therapy. Elsevier, 2016.     WOUND CARE INSTRUCTIONS  FOR CRYOSURGERY        This area treated with liquid nitrogen will form a blister. You do not need to bandage the area until after the blister forms and breaks (which may be a few days).  When the blister breaks, begin daily dressing changes as follows:    1) Clean and dry the area with tap water using clean Q-tip or sterile gauze pad.    2) Apply Polysporin ointment or Bacitracin ointment over entire wound.  Do NOT use Neosporin ointment.    3) Cover the wound with a band-aid or sterile non-stick gauze pad and micropore paper tape.      REPEAT THESE INSTRUCTIONS AT LEAST ONCE A DAY UNTIL THE WOUND HAS COMPLETELY HEALED.        It is an old wives tale that a wound heals better when it is exposed to air and allowed to dry out. The wound will heal faster with a better cosmetic result if it is kept moist with ointment and covered with a bandage.  Do not let the wound dry out.      IMPORTANT INFORMATION ON REVERSE SIDE    Supplies Needed:     *Cotton tipped applicators (Q-tips)   *Polysporin ointment or Bacitracin ointment (NOT NEOSPORIN)   *Band-aids, or non stick gauze pads and micropore paper tape                PATIENT INFORMATION    During the healing process you will notice a number of changes. All wounds  develop a small halo of redness surrounding the wound.  This means healing is occurring. Severe itching with extensive redness usually indicates sensitivity to the ointment or bandage tape used to dress the wound.  You should call our office if this develops.      Swelling and/or discoloration around your surgical site is common, particularly when performed around the eye.    All wounds normally drain.  The larger the wound the more drainage there will be.  After 7-10 days, you will notice the wound beginning to shrink and new skin will begin to grow.  The wound is healed when you can see skin has formed over the entire area.  A healed wound has a healthy, shiny look to the surface and is red to dark pink in color to normalize.  Wounds may take approximately 4-6 weeks to heal.  Larger wounds may take 6-8 weeks.  After the wound is healed you may discontinue dressing changes.    You may experience a sensation of tightness as your wound heals. This is normal and will gradually subside.    Your healed wound may be sensitive to temperature changes. This sensitivity improves with time, but if you re having a lot of discomfort, try to avoid temperature extremes.    Patients frequently experience itching after their wound appears to have healed because of the continue healing under the skin.  Plain Vaseline will help relieve the itching.

## 2018-07-12 NOTE — TELEPHONE ENCOUNTER
Left message on answering machine for patient to call back regarding bp  (Was elevated in clinic)  Spoke with patient on cell #- states that she took her blood pressure medication-propanolol  She did request the nurse come to check her BP- she is not there yet- asked to keep us updated.  Patient denies headache/chest pain/facial and arm numbness  Advised that these are warning symptoms and she needs to call asap if she has any of these.  Patient verbalized udnerstanding    Brandi OsorioRN BSN  [facility]  [phone]

## 2018-07-12 NOTE — MR AVS SNAPSHOT
After Visit Summary   7/12/2018    Nelda Watts    MRN: [id number]           Patient Information     Date Of Birth          1/29/1933        Visit Information        Provider Department      7/12/2018 10:40 AM Dee Diana [place]-C [facility]        Care Instructions    Proper skin care from [facility]:    -Eliminate harsh soaps as they strip the natural oils from the skin, often resulting in dry itchy skin ( i.e. Dial, Zest, Abimbola Spring)  -Use mild soaps such as Cetaphil or Dove Sensitive Skin in the shower. You do not need to use soap on arms, legs, and trunk every time you shower unless visibly soiled.   -Avoid hot or cold showers.  -After showering, lightly dry off and apply moisturizing within 2-3 minutes. This will help trap moisture in the skin.   -Aggressive use of a moisturizer at least 1-2 times a day to the entire body (including -Vanicream, Cetaphil, Aquaphor or Cerave) and moisturize hands after every washing.  -We recommend using moisturizers that come in a tub that needs to be scooped out, not a pump. This has more of an oil base. It will hold moisture in your skin much better than a water base moisturizer. The above recommended are non-pore clogging.           Wear a sunscreen with at least SPF 30 on your face, ears, neck and V of the chest daily. Wear sunscreen on other areas of the body if those areas are exposed to the sun throughout the day. Sunscreens can contain physical and/or chemical blockers. Physical blockers are less likely to clog pores, these include zinc oxide and titanium dioxide. Reapply every two hour and after swimming. Sunscreen examples include Neutrogena, CeraVe, Blue Lizard, Elta MD and many others.    UV radiation  UVA radiation remains constant throughout the day and throughout the year. It is a longer wavelength than UVB and therefore penetrates deeper into the skin leading to immediate and delayed tanning, photoaging,  and skin cancer. 70-80% of UVA and UVB radiation occurs between the hours of 10am-2pm.  UVB radiation  UVB radiation causes the most harmful effects and is more significant during the summer months. However, snow and ice can reflect UVB radiation leading to skin damage during the winter months as well. UVB radiation is responsible for tanning, burning, inflammation, delayed erythema (pinkness), pigmentation (brown spots), and skin cancer.   Just because you do not burn or are not developing a tan does not mean that you are not damaging your skin. A 15 minute drive to and from work for 30 years an lead to chronic sun damage of the skin. It is important to wear a broad spectrum (both UVA and UVB) sunscreen EVERY day with at least 30 SPF. Apply to face, ears, neck and v of the chest as this is where most of our sun exposure is. Reapply sunscreen every two hours if you plan on being outside.   Manish Monae. Clinical Dermatology: A Color Guide to Diagnosis and Therapy. Elsevier, 2016.     WOUND CARE INSTRUCTIONS  FOR CRYOSURGERY        This area treated with liquid nitrogen will form a blister. You do not need to bandage the area until after the blister forms and breaks (which may be a few days).  When the blister breaks, begin daily dressing changes as follows:    1) Clean and dry the area with tap water using clean Q-tip or sterile gauze pad.    2) Apply Polysporin ointment or Bacitracin ointment over entire wound.  Do NOT use Neosporin ointment.    3) Cover the wound with a band-aid or sterile non-stick gauze pad and micropore paper tape.      REPEAT THESE INSTRUCTIONS AT LEAST ONCE A DAY UNTIL THE WOUND HAS COMPLETELY HEALED.        It is an old wives tale that a wound heals better when it is exposed to air and allowed to dry out. The wound will heal faster with a better cosmetic result if it is kept moist with ointment and covered with a bandage.  Do not let the wound dry out.      IMPORTANT INFORMATION ON  REVERSE SIDE    Supplies Needed:     *Cotton tipped applicators (Q-tips)   *Polysporin ointment or Bacitracin ointment (NOT NEOSPORIN)   *Band-aids, or non stick gauze pads and micropore paper tape                PATIENT INFORMATION    During the healing process you will notice a number of changes. All wounds develop a small halo of redness surrounding the wound.  This means healing is occurring. Severe itching with extensive redness usually indicates sensitivity to the ointment or bandage tape used to dress the wound.  You should call our office if this develops.      Swelling and/or discoloration around your surgical site is common, particularly when performed around the eye.    All wounds normally drain.  The larger the wound the more drainage there will be.  After 7-10 days, you will notice the wound beginning to shrink and new skin will begin to grow.  The wound is healed when you can see skin has formed over the entire area.  A healed wound has a healthy, shiny look to the surface and is red to dark pink in color to normalize.  Wounds may take approximately 4-6 weeks to heal.  Larger wounds may take 6-8 weeks.  After the wound is healed you may discontinue dressing changes.    You may experience a sensation of tightness as your wound heals. This is normal and will gradually subside.    Your healed wound may be sensitive to temperature changes. This sensitivity improves with time, but if you re having a lot of discomfort, try to avoid temperature extremes.    Patients frequently experience itching after their wound appears to have healed because of the continue healing under the skin.  Plain Vaseline will help relieve the itching.                   Follow-ups after your visit        Your next 10 appointments already scheduled     Jul 18, 2018  1:45 PM CDT   MR BREAST BILATERAL W/O & W CONTRAST with SHMRP2   [facility] [place] [facility] ([facility])    [address] [place] [id number]    [phone]           Take your medicines as usual, unless your doctor tells you not to. Bring a list of your current medicines to your exam (including vitamins, minerals and over-the-counter drugs).  The timing of your exam may depend on the start of your last period. If you re in menopause, you may have the exam anytime.  Please bring any previous mammograms or breast MRIs from other facilities to the MRI dept. Do not mail these items to us.   You will have IV contrast for this exam.  You do not need to do anything special to prepare.  The MRI machine uses a strong magnet. Please wear clothes without metal (snaps, zippers). A sweatsuit works well, or we may give you a hospital gown.  Please remove any body piercings and hair extensions before you arrive. You will also remove watches, jewelry, hairpins, wallets, dentures, partial dental plates and hearing aids. You may wear contact lenses, and you may be able to wear your rings. We have a safe place to keep your personal items, but it is safer to leave them at home.  **IMPORTANT** THE INSTRUCTIONS BELOW ARE ONLY FOR THOSE PATIENTS WHO HAVE BEEN PRESCRIBED SEDATION OR GENERAL ANESTHESIA DURING THEIR MRI PROCEDURE:  IF YOUR DOCTOR PRESCRIBED ORAL SEDATION (take medicine to help you relax during your exam):   You must get the medicine from your doctor (oral medication) before you arrive. Bring the medicine to the exam. Do not take it at home. You ll be told when to take it upon arriving for your exam.   Arrive one hour early. Bring someone who can take you home after the test. Your medicine will make you sleepy. After the exam, you may not drive, take a bus or take a taxi by yourself.  IF YOUR DOCTOR PRESCRIBED IV SEDATION:   Arrive one hour early. Bring someone who can take you home after the test. Your medicine will make you sleepy. After the exam, you may not drive, take a bus or take a taxi by yourself.   No eating 6 hours before your exam. You may have clear  "liquids up until 4 hours before your exam. (Clear liquids include water, clear tea, black coffee and fruit juice without pulp.)  IF YOUR DOCTOR PRESCRIBED ANESTHESIA (be asleep for your exam):   Arrive 1 1/2 hours early. Bring someone who can take you home after the test. You may not drive, take a bus or take a taxi by yourself.   No eating 8 hours before your exam. You may have clear liquids up until 4 hours before your exam. (Clear liquids include water, clear tea, black coffee and fruit juice without pulp.)   You will spend four to five hours in the recovery room.  If you have any questions, please contact your Imaging Department exam site.              Who to contact     If you have questions or need follow up information about today's clinic visit or your schedule please contact [facility] directly at [phone].  Normal or non-critical lab and imaging results will be communicated to you by MyChart, letter or phone within 4 business days after the clinic has received the results. If you do not hear from us within 7 days, please contact the clinic through [employer] [employer] or phone. If you have a critical or abnormal lab result, we will notify you by phone as soon as possible.  Submit refill requests through [email or web address] or call your pharmacy and they will forward the refill request to us. Please allow 3 business days for your refill to be completed.          Additional Information About Your Visit        [employer] Information     [email or web address] lets you send messages to your doctor, view your test results, renew your prescriptions, schedule appointments and more. To sign up, go to [email or web address] . Click on \"Log in\" on the left side of the screen, which will take you to the Welcome page. Then click on \"Sign up Now\" on the right side of the page.     You will be asked to enter the access code listed below, as well as some personal information. Please follow the directions to create your username and " password.     Your access code is: [email or web address]  Expires: 10/10/2018 10:58 AM     Your access code will  in 90 days. If you need help or a new code, please call your [facility] clinic or [phone].        Care EveryWhere ID     This is your Care EveryWhere ID. This could be used by other organizations to access your [facility] medical records  [id number]        Your Vitals Were     Pulse Pulse Oximetry                77 97%           Blood Pressure from Last 3 Encounters:   18 (!) 221/134   09/10/17 (!) 180/117   11/02/15 (!) 160/93    Weight from Last 3 Encounters:   09/10/17 145 lb (65.8 kg)   11/02/15 135 lb (61.2 kg)   10/11/15 145 lb (65.8 kg)              Today, you had the following     No orders found for display       Primary Care Provider Office Phone # Fax #    Ryland Torres [phone]       [address] [address]        Equal Access to Services     : Hadii aad ku hadasho Solexali, waaxda luqadaha, qaybta kaalmada joshua, aram arias . [facility] [phone].    ATENCIÓN: Si habla español, tiene a albright disposición servicios gratuitos de asistencia lingüística. Jose E al [phone].    We comply with applicable federal civil rights laws and [place] laws. We do not discriminate on the basis of race, color, national origin, age, disability, sex, sexual orientation, or gender identity.            Thank you!     Thank you for choosing [facility]  for your care. Our goal is always to provide you with excellent care. Hearing back from our patients is one way we can continue to improve our services. Please take a few minutes to complete the written survey that you may receive in the mail after your visit with us. Thank you!             Your Updated Medication List - Protect others around you: Learn how to safely use, store and throw away your medicines at www.disposemymeds.org.           This list is accurate as of 7/12/18 10:58 AM.  Always use your most recent med list.                   Brand Name Dispense Instructions for use Diagnosis    acetaminophen 650 MG 8 hour tablet     100 tablet    Take 650 mg by mouth every 6 hours    Status post total right knee replacement       ALLEGRA PO      Take 60 mg by mouth daily Breaks tab in 1/2 and takes 1/2 tab twice daily of single daily dose tab.        B-12 PO      Take 1,000 mg by mouth every evening Patient does not know strength        calcium-vitamin D 600-400 MG-UNIT per tablet    CALTRATE     Take 1 tablet by mouth 2 times daily        CLONAZEPAM PO      Take 0.5 mg by mouth daily as needed for anxiety        COENZYME Q-10 PO      Take 200 mg by mouth daily        COUMADIN PO      Take 4 mg by mouth daily        cyanocobalamin 1000 MCG/ML injection    VITAMIN B12     Inject 1 mL into the muscle every 30 days Patient receives on the first week of each month        LACTOBACILLUS PO      Take 1 capsule by mouth 2 times daily        MAGNESIUM OXIDE PO      Take 500 mg by mouth At Bedtime        MELATONIN PO      Take 3 mg by mouth At Bedtime        metoclopramide 5 MG tablet    REGLAN    20 tablet    Take 1 tablet (5 mg) by mouth 3 times daily as needed        Multi-vitamin Tabs tablet      Take 1 tablet by mouth daily        OMEPRAZOLE PO      Take 20 mg by mouth 2 times daily (before meals)        oxyCODONE IR 5 MG tablet    ROXICODONE    100 tablet    Take 1 tablet (5 mg) by mouth every 3 hours as needed for moderate to severe pain    Status post total right knee replacement       prenatal multivitamin plus iron 27-0.8 MG Tabs per tablet      Take 1 tablet by mouth daily        PROPRANOLOL HCL PO      Take 40 mg by mouth 4 times daily        sennosides 8.6 MG tablet    SENOKOT     Take 1 tablet by mouth 2 times daily        ZOFRAN PO      Take 4 mg by mouth every 6 hours as needed for nausea or vomiting

## 2018-07-12 NOTE — PROGRESS NOTES
HPI:  Nelda Watts is a 85 year old year old female patient here today for FBE. History of LM, BCC, atypical nevi. Has a new spot on left arm   Duration: months  Symptoms:  Growing and itching     Previous treatments: none     Alleviating/aggravating factors: none    Associated symptoms: none  Pertinent findings:LM 2017  Patient has no other skin complaints today.  Remainder of the HPI, Meds, PMH, Allergies, FH, and SH was reviewed in chart.      Past Medical History:   Diagnosis Date     Anxiety and depression      Arrhythmia     AF     Atrial fibrillation (H)      Blastocystis hominis infection      Cholelithiasis      Cysts     in liver     Diverticulitis      Ductal carcinoma (H)     infiltrating ductal carcinoma right breast     Essential tremor      Gastro-oesophageal reflux disease      Hypertension      Hypothyroidism (acquired)      IFG (impaired fasting glucose)      Myalgia and myositis, unspecified      Nausea     chronic     OA (osteoarthritis)      Scoliosis      Stroke (H) april 2011    has had two strokes, last in april 2011, in which she started on COUMADON     Suprasellar mass      Unspecified diastolic heart failure      Uterine fibroid      Vitamin B12 deficiency        Past Surgical History:   Procedure Laterality Date     APPENDECTOMY       ARTHROPLASTY KNEE Right 9/28/2015    Procedure: ARTHROPLASTY KNEE;  Surgeon: Lucius Smith MD;  Location: [facility] OR     BIOPSY  1956,1997    breast     BIOPSY  1999    muscle     BREAST SURGERY      right lumpectomy 20 DEC 13     BREAST SURGERY Right     hematoma evacuation     GI SURGERY       GYN SURGERY Bilateral     salpingectomy     GYN SURGERY  1963    hysterectomy     ORTHOPEDIC SURGERY      bunionectomy     ORTHOPEDIC SURGERY      cyst removal from toe     VASCULAR SURGERY  1980,2004    vein stripping        No family history on file.    Social History     Social History     Marital status:      Spouse name: N/A     Number of children:  N/A     Years of education: N/A     Occupational History     Not on file.     Social History Main Topics     Smoking status: Never Smoker     Smokeless tobacco: Never Used     Alcohol use 3.5 oz/week     7 Glasses of wine per week     Drug use: No     Sexual activity: Not on file     Other Topics Concern     Not on file     Social History Narrative       Outpatient Encounter Prescriptions as of 7/12/2018   Medication Sig Dispense Refill     acetaminophen 650 MG TABS Take 650 mg by mouth every 6 hours 100 tablet      calcium-vitamin D (CALTRATE) 600-400 MG-UNIT per tablet Take 1 tablet by mouth 2 times daily        CLONAZEPAM PO Take 0.5 mg by mouth daily as needed for anxiety       COENZYME Q-10 PO Take 200 mg by mouth daily       Cyanocobalamin (B-12 PO) Take 1,000 mg by mouth every evening Patient does not know strength       cyanocobalamin 1000 MCG/ML injection Inject 1 mL into the muscle every 30 days Patient receives on the first week of each month       Fexofenadine HCl (ALLEGRA PO) Take 60 mg by mouth daily Breaks tab in 1/2 and takes 1/2 tab twice daily of single daily dose tab.       LACTOBACILLUS PO Take 1 capsule by mouth 2 times daily       MAGNESIUM OXIDE PO Take 500 mg by mouth At Bedtime        MELATONIN PO Take 3 mg by mouth At Bedtime        metoclopramide (REGLAN) 5 MG tablet Take 1 tablet (5 mg) by mouth 3 times daily as needed 20 tablet 0     multivitamin, therapeutic with minerals (MULTI-VITAMIN) TABS Take 1 tablet by mouth daily       OMEPRAZOLE PO Take 20 mg by mouth 2 times daily (before meals)       Ondansetron HCl (ZOFRAN PO) Take 4 mg by mouth every 6 hours as needed for nausea or vomiting       oxyCODONE (ROXICODONE) 5 MG immediate release tablet Take 1 tablet (5 mg) by mouth every 3 hours as needed for moderate to severe pain 100 tablet 0     Prenatal Vit-Fe Fumarate-FA (PRENATAL MULTIVITAMIN  PLUS IRON) 27-0.8 MG TABS Take 1 tablet by mouth daily       PROPRANOLOL HCL PO Take 40 mg by  mouth 4 times daily        sennosides (SENOKOT) 8.6 MG tablet Take 1 tablet by mouth 2 times daily       Warfarin Sodium (COUMADIN PO) Take 4 mg by mouth daily        No facility-administered encounter medications on file as of 7/12/2018.        Review Of Systems:  Skin: As above  Eyes: negative  Ears/Nose/Throat: negative  Respiratory: No shortness of breath, dyspnea on exertion, cough, or hemoptysis  Cardiovascular: negative  Gastrointestinal: negative  Genitourinary: negative  Musculoskeletal: negative  Neurologic: negative  Psychiatric: negative  Hematologic/Lymphatic/Immunologic: negative  Endocrine: negative      Objective:     BP (!) 221/134  Pulse 77  SpO2 97%  Eyes: Conjunctivae/lids: Normal   ENT: Lips:  Normal  MSK: Normal  Cardiovascular: Peripheral edema none  Pulm: Breathing Normal  Neuro/Psych: Orientation: Normal; Mood/Affect: Normal, NAD, WDWN  Following areas examined: Lymph Nodes: No Head and Neck Lymphadenopathy   Following lymph nodes palpated: Occipital, Cervical, Supraclavicular and axillae  Scalp, face, eyelids, lips, neck, chest, abdomen, back, buttocks, and R&L upper and lower extremities.      Elkins skin type:ll   Findings:    1) Cherry angiomas on trunk and extremities.  2) Seborrheic keratoses on trunk and extremities.  3) Benign nevi on trunk and extremities.  4) Lentigines on face, neck, trunk, and extremities.  5) Inflamed brown, stuck-on scaly appearing papules on left arm and left inframammary fold.           Assessment and Plan:  1) Red smooth well-defined macules on trunk and extremities.  2) Brown, stuck-on scaly appearing papules on trunk and extremities.  3) Well circumscribed macules with symmetric color distribution on trunk and extremities.  4) Vences WD smooth macules on face, neck, trunk, and extremities.  I discussed the specifics of tumor, prognosis, and genetics of benign lesions.  I explained that treatment of these lesions would be purely cosmetic and not  medically neccessary.  I discussed with patient different removal options including excision, cryotherapy, cautery and /or laser.      5) ISK x 2  LN2: Treated with LN2 for 5s for 1-2 cycles. Warned risks of blistering, pain, pigment change, scarring, and incomplete resolution.  Advised patient to return if lesions do not completely resolve within 2-3 months.  Wound care sheet given.  6) personal hx of LM on right upper back and BCC thigh and ankle  Signs and Symptoms of non-melanoma skin cancer and ABCDEs of melanoma reviewed with patient. Patient encouraged to perform monthly self skin exams and educated on how to perform them. UV precautions reviewed with patient. Patient was asked about new or changing moles/lesions on body.   No signs of recurrence.   7) significantly elevated blood pressure  Urgency/emergency  Pt defers going to UC. States she will go back home (senior housing) and touch base with the nurse there. States her blood pressure always increases at OV and will return to 130/80 when she gets home. I highly encouraged pt to go to ED/UC as blood pressure this high can increase risk for end organ damage, stroke, MI, etc.   Patient to follow up with Primary Care provider regarding elevated blood pressure.    Follow up in 6 months

## 2018-07-18 ENCOUNTER — HOSPITAL ENCOUNTER (OUTPATIENT)
Dept: MRI IMAGING | Facility: CLINIC | Age: 83
Discharge: HOME | End: 2018-07-18
Attending: INTERNAL MEDICINE | Admitting: INTERNAL MEDICINE
Payer: MEDICARE

## 2018-07-18 DIAGNOSIS — Z17.0 MALIGNANT NEOPLASM OF LOWER-OUTER QUADRANT OF LEFT BREAST OF FEMALE, ESTROGEN RECEPTOR POSITIVE (H): ICD-10-CM

## 2018-07-18 DIAGNOSIS — Z98.890 H/O LUMPECTOMY: ICD-10-CM

## 2018-07-18 DIAGNOSIS — C50.512 MALIGNANT NEOPLASM OF LOWER-OUTER QUADRANT OF LEFT BREAST OF FEMALE, ESTROGEN RECEPTOR POSITIVE (H): ICD-10-CM

## 2018-07-18 LAB
CREAT BLD-MCNC: 0.8 MG/DL (ref 0.52–1.04)
GFR SERPL CREATININE-BSD FRML MDRD: 68 ML/MIN/1.7M2

## 2018-07-18 PROCEDURE — 82565 ASSAY OF CREATININE: CPT

## 2018-07-18 PROCEDURE — 25000128 H RX IP 250 OP 636: Performed by: INTERNAL MEDICINE

## 2018-07-18 PROCEDURE — A9585 GADOBUTROL INJECTION: HCPCS | Performed by: INTERNAL MEDICINE

## 2018-07-18 PROCEDURE — 77059 MR BREAST BILATERAL W/O & W CONTRAST: CPT

## 2018-07-18 RX ORDER — GADOBUTROL 604.72 MG/ML
6 INJECTION INTRAVENOUS ONCE
Status: COMPLETED | OUTPATIENT
Start: 2018-07-18 | End: 2018-07-18

## 2018-07-18 RX ORDER — ACYCLOVIR 200 MG/1
30 CAPSULE ORAL ONCE
Status: DISCONTINUED | OUTPATIENT
Start: 2018-07-18 | End: 2018-07-19 | Stop reason: HOSPADM

## 2018-07-18 RX ADMIN — GADOBUTROL 6 ML: 604.72 INJECTION INTRAVENOUS at 14:54

## 2018-07-19 ENCOUNTER — TELEPHONE (OUTPATIENT)
Dept: SURGERY | Facility: CLINIC | Age: 83
End: 2018-07-19

## 2018-07-19 NOTE — TELEPHONE ENCOUNTER
Call placed to patient.  verified. Patient notified Breast MRI performed on 2018 revealed:    Findings: Breast conservation therapy changes are again seen in the  RIGHT breast.      No concerning areas of enhancement on either side.      No lymphadenopathy.     Impression: BI-RADS CATEGORY: 2 - Benign Finding(s).     Recommendation: Annual breast cancer screening.    Patient has follow scheduled with Dr. Cabezas next week. Encouraged patient to keep appt as scheduled. Both parties in agreement of plan.    Anuja Yu RN, BSN, OCN  Nurse Navigator   [facility]  [phone]

## 2018-10-19 ENCOUNTER — OFFICE VISIT (OUTPATIENT)
Dept: FAMILY MEDICINE | Facility: CLINIC | Age: 83
End: 2018-10-19
Payer: COMMERCIAL

## 2018-10-19 VITALS — HEART RATE: 78 BPM | DIASTOLIC BLOOD PRESSURE: 103 MMHG | RESPIRATION RATE: 20 BRPM | SYSTOLIC BLOOD PRESSURE: 152 MMHG

## 2018-10-19 DIAGNOSIS — L82.1 SEBORRHEIC KERATOSES: ICD-10-CM

## 2018-10-19 DIAGNOSIS — L82.0 INFLAMED SEBORRHEIC KERATOSIS: ICD-10-CM

## 2018-10-19 DIAGNOSIS — D22.9 MULTIPLE NEVI: ICD-10-CM

## 2018-10-19 DIAGNOSIS — D48.5 NEOPLASM OF UNCERTAIN BEHAVIOR OF SKIN: Primary | ICD-10-CM

## 2018-10-19 DIAGNOSIS — D18.01 CHERRY ANGIOMA: ICD-10-CM

## 2018-10-19 DIAGNOSIS — Z85.828 HISTORY OF BASAL CELL CARCINOMA: ICD-10-CM

## 2018-10-19 DIAGNOSIS — L81.4 LENTIGINES: ICD-10-CM

## 2018-10-19 DIAGNOSIS — Z85.820 HISTORY OF MELANOMA: ICD-10-CM

## 2018-10-19 PROCEDURE — 11100 HC DESTRUCT BENIGN LESION, 15 OR MORE: CPT | Mod: 59 | Performed by: PHYSICIAN ASSISTANT

## 2018-10-19 PROCEDURE — 88305 TISSUE EXAM BY PATHOLOGIST: CPT | Mod: TC | Performed by: PHYSICIAN ASSISTANT

## 2018-10-19 PROCEDURE — 99213 OFFICE O/P EST LOW 20 MIN: CPT | Mod: 25 | Performed by: PHYSICIAN ASSISTANT

## 2018-10-19 PROCEDURE — 17111 DESTRUCTION B9 LESIONS 15/>: CPT | Performed by: PHYSICIAN ASSISTANT

## 2018-10-19 NOTE — PROGRESS NOTES
HPI:  Nelda Watts is a 85 year old female patient here today for spot on left thigh.  Patient states this has been present for 6-12 months.  Patient reports the following symptoms: non healing .  Patient reports the following previous treatments: none.  Patient reports the following modifying factors: none.  Associated symptoms: none. Pt would like a full body exam today. Had LM 2017 on right upper back.  Patient has no other skin complaints today.  Remainder of the HPI, Meds, PMH, Allergies, FH, and SH was reviewed in chart.    Pertinent Hx:   LM and BCC  Past Medical History:   Diagnosis Date     Anxiety and depression      Arrhythmia     AF     Atrial fibrillation (H)      Blastocystis hominis infection      Cholelithiasis      Cysts     in liver     Diverticulitis      Ductal carcinoma (H)     infiltrating ductal carcinoma right breast     Essential tremor      Gastro-oesophageal reflux disease      Hypertension      Hypothyroidism (acquired)      IFG (impaired fasting glucose)      Myalgia and myositis, unspecified      Nausea     chronic     OA (osteoarthritis)      Scoliosis      Stroke (H) april 2011    has had two strokes, last in april 2011, in which she started on COUMADON     Suprasellar mass      Unspecified diastolic heart failure      Uterine fibroid      Vitamin B12 deficiency        Past Surgical History:   Procedure Laterality Date     APPENDECTOMY       ARTHROPLASTY KNEE Right 9/28/2015    Procedure: ARTHROPLASTY KNEE;  Surgeon: Lucius Smith MD;  Location: [facility] OR     BIOPSY  1956,1997    breast     BIOPSY  1999    muscle     BREAST SURGERY      right lumpectomy 20 DEC 13     BREAST SURGERY Right     hematoma evacuation     GI SURGERY       GYN SURGERY Bilateral     salpingectomy     GYN SURGERY  1963    hysterectomy     ORTHOPEDIC SURGERY      bunionectomy     ORTHOPEDIC SURGERY      cyst removal from toe     VASCULAR SURGERY  1980,2004    vein stripping        History reviewed. No  pertinent family history.    Social History     Social History     Marital status:      Spouse name: N/A     Number of children: N/A     Years of education: N/A     Occupational History     Not on file.     Social History Main Topics     Smoking status: Never Smoker     Smokeless tobacco: Never Used     Alcohol use 3.5 oz/week     7 Glasses of wine per week     Drug use: No     Sexual activity: Not on file     Other Topics Concern     Not on file     Social History Narrative       Outpatient Encounter Prescriptions as of 10/19/2018   Medication Sig Dispense Refill     acetaminophen 650 MG TABS Take 650 mg by mouth every 6 hours 100 tablet      calcium-vitamin D (CALTRATE) 600-400 MG-UNIT per tablet Take 1 tablet by mouth 2 times daily        CLONAZEPAM PO Take 0.5 mg by mouth daily as needed for anxiety       COENZYME Q-10 PO Take 200 mg by mouth daily       Cyanocobalamin (B-12 PO) Take 1,000 mg by mouth every evening Patient does not know strength       cyanocobalamin 1000 MCG/ML injection Inject 1 mL into the muscle every 30 days Patient receives on the first week of each month       Fexofenadine HCl (ALLEGRA PO) Take 60 mg by mouth daily Breaks tab in 1/2 and takes 1/2 tab twice daily of single daily dose tab.       LACTOBACILLUS PO Take 1 capsule by mouth 2 times daily       MAGNESIUM OXIDE PO Take 500 mg by mouth At Bedtime        MELATONIN PO Take 3 mg by mouth At Bedtime        metoclopramide (REGLAN) 5 MG tablet Take 1 tablet (5 mg) by mouth 3 times daily as needed 20 tablet 0     multivitamin, therapeutic with minerals (MULTI-VITAMIN) TABS Take 1 tablet by mouth daily       OMEPRAZOLE PO Take 20 mg by mouth 2 times daily (before meals)       Ondansetron HCl (ZOFRAN PO) Take 4 mg by mouth every 6 hours as needed for nausea or vomiting       oxyCODONE (ROXICODONE) 5 MG immediate release tablet Take 1 tablet (5 mg) by mouth every 3 hours as needed for moderate to severe pain 100 tablet 0     Prenatal  Vit-Fe Fumarate-FA (PRENATAL MULTIVITAMIN  PLUS IRON) 27-0.8 MG TABS Take 1 tablet by mouth daily       PROPRANOLOL HCL PO Take 40 mg by mouth 4 times daily        sennosides (SENOKOT) 8.6 MG tablet Take 1 tablet by mouth 2 times daily       Warfarin Sodium (COUMADIN PO) Take 4 mg by mouth daily        No facility-administered encounter medications on file as of 10/19/2018.        Review Of Systems:  Skin: As above  Eyes: negative  Ears/Nose/Throat: negative  Respiratory: No shortness of breath, dyspnea on exertion, cough, or hemoptysis  Cardiovascular: negative  Gastrointestinal: negative  Genitourinary: negative  Musculoskeletal: negative  Neurologic: negative  Psychiatric: negative  Hematologic/Lymphatic/Immunologic: negative  Endocrine: negative      Objective:     BP (!) 152/103  Pulse 78  Resp 20  Breastfeeding? No  Eyes: Conjunctivae/lids: Normal   ENT: Lips:  Normal  MSK: Normal  Cardiovascular: Peripheral edema none  Pulm: Breathing Normal  Neuro/Psych: Orientation: Normal; Mood/Affect: Normal, NAD, WDWN  Pt accompanied by: self  Following areas examined:   Scalp, face, eyelids, lips, neck, chest, abdomen, back, buttocks, and R&L upper and lower extremities.      Elkins skin type:i   Findings:  Tan WD smooth macules on face, neck, trunk, and extremities.  Brown, stuck-on scaly appearing papules on trunk and extremities.  Red smooth well-defined macules on trunk and extremities.  Well circumscribed macules with symmetric color distribution on trunk and extremities 2-3mm  Inflamed brown, stuck-on scaly appearing papules on base of neck  Hypopigmented linear patch on right upper back and right thigh and ankle  wd red excoritated papule on left mid ventral thigh 0.3cm  Assessment and Plan:  1) Lentigines, multiple benign nevi, cherry angiomas, and seborrheic keratoses    Treatment of these lesions would be purely cosmetic and not medically neccessary.  Lesion may recur and/or may not completely resolve.  May need additional treatment.  Different removal options including excision, cryotherapy, cautery and /or laser.      2) ISK x 15  Benign etiology and course of lesion.     LN2: Treated with LN2 for 5s for 1-2 cycles. Warned risks of blistering, pain, pigment change, scarring, and incomplete resolution.  Advised patient to return if lesions do not completely resolve within 2-3 months.  Wound care sheet given.    3) Neoplasm of uncertain behavior left mid ventral thigh 0.3cm  Ruptured folliculitis vs BCC  TANGENTIAL BIOPSY:  After consent, anesthesia with [facility]EC and prep, tangential biopsy performed.  No complications and routine wound care.  May grow back and will get a scar. Based on lesion type may need to completely remove lesion. Patient will be notified in 7-10 days of results. Wound care directions given.    4) history of BCC and LM  Signs and Symptoms of non-melanoma skin cancer and ABCDEs of melanoma reviewed with patient. Patient encouraged to perform monthly self skin exams and educated on how to perform them. UV precautions reviewed with patient. Patient was asked about new or changing moles/lesions on body.   Wear a sunscreen with at least SPF 30 on your face, ears, neck and V of the chest daily. Wear sunscreen on other areas of the body if those areas are exposed to the sun throughout the day. Sunscreens can contain physical and/or chemical blockers. Physical blockers are less likely to clog pores, these include zinc oxide and titanium dioxide. Reapply every two hour and after swimming. Sunscreen examples include Neutrogena, CeraVe, Blue Lizard, Elta MD and many others.    5) High blood pressure reading with history of hypertension      Patient to follow up with Primary Care provider regarding elevated blood pressure.          Follow up in 6 months for FBE

## 2018-10-19 NOTE — LETTER
10/19/2018         RE: Nelda Watts  [address]  [place] [address]        Dear Colleague,    Thank you for referring your patient, Nelda Watts, to the [facility]. Please see a copy of my visit note below.    HPI:  Nelda Watts is a 85 year old female patient here today for spot on left thigh.  Patient states this has been present for 6-12 months.  Patient reports the following symptoms: non healing .  Patient reports the following previous treatments: none.  Patient reports the following modifying factors: none.  Associated symptoms: none. Pt would like a full body exam today. Had LM 2017 on right upper back.  Patient has no other skin complaints today.  Remainder of the HPI, Meds, PMH, Allergies, FH, and SH was reviewed in chart.    Pertinent Hx:   LM and BCC  Past Medical History:   Diagnosis Date     Anxiety and depression      Arrhythmia     AF     Atrial fibrillation (H)      Blastocystis hominis infection      Cholelithiasis      Cysts     in liver     Diverticulitis      Ductal carcinoma (H)     infiltrating ductal carcinoma right breast     Essential tremor      Gastro-oesophageal reflux disease      Hypertension      Hypothyroidism (acquired)      IFG (impaired fasting glucose)      Myalgia and myositis, unspecified      Nausea     chronic     OA (osteoarthritis)      Scoliosis      Stroke (H) april 2011    has had two strokes, last in april 2011, in which she started on COUMADON     Suprasellar mass      Unspecified diastolic heart failure      Uterine fibroid      Vitamin B12 deficiency        Past Surgical History:   Procedure Laterality Date     APPENDECTOMY       ARTHROPLASTY KNEE Right 9/28/2015    Procedure: ARTHROPLASTY KNEE;  Surgeon: Lucius Smith MD;  Location: [facility] OR     BIOPSY  1956,1997    breast     BIOPSY  1999    muscle     BREAST SURGERY      right lumpectomy 20 DEC 13     BREAST SURGERY Right     hematoma evacuation     GI  SURGERY       GYN SURGERY Bilateral     salpingectomy     GYN SURGERY  1963    hysterectomy     ORTHOPEDIC SURGERY      bunionectomy     ORTHOPEDIC SURGERY      cyst removal from toe     VASCULAR SURGERY  1980,2004    vein stripping        History reviewed. No pertinent family history.    Social History     Social History     Marital status:      Spouse name: N/A     Number of children: N/A     Years of education: N/A     Occupational History     Not on file.     Social History Main Topics     Smoking status: Never Smoker     Smokeless tobacco: Never Used     Alcohol use 3.5 oz/week     7 Glasses of wine per week     Drug use: No     Sexual activity: Not on file     Other Topics Concern     Not on file     Social History Narrative       Outpatient Encounter Prescriptions as of 10/19/2018   Medication Sig Dispense Refill     acetaminophen 650 MG TABS Take 650 mg by mouth every 6 hours 100 tablet      calcium-vitamin D (CALTRATE) 600-400 MG-UNIT per tablet Take 1 tablet by mouth 2 times daily        CLONAZEPAM PO Take 0.5 mg by mouth daily as needed for anxiety       COENZYME Q-10 PO Take 200 mg by mouth daily       Cyanocobalamin (B-12 PO) Take 1,000 mg by mouth every evening Patient does not know strength       cyanocobalamin 1000 MCG/ML injection Inject 1 mL into the muscle every 30 days Patient receives on the first week of each month       Fexofenadine HCl (ALLEGRA PO) Take 60 mg by mouth daily Breaks tab in 1/2 and takes 1/2 tab twice daily of single daily dose tab.       LACTOBACILLUS PO Take 1 capsule by mouth 2 times daily       MAGNESIUM OXIDE PO Take 500 mg by mouth At Bedtime        MELATONIN PO Take 3 mg by mouth At Bedtime        metoclopramide (REGLAN) 5 MG tablet Take 1 tablet (5 mg) by mouth 3 times daily as needed 20 tablet 0     multivitamin, therapeutic with minerals (MULTI-VITAMIN) TABS Take 1 tablet by mouth daily       OMEPRAZOLE PO Take 20 mg by mouth 2 times daily (before meals)        Ondansetron HCl (ZOFRAN PO) Take 4 mg by mouth every 6 hours as needed for nausea or vomiting       oxyCODONE (ROXICODONE) 5 MG immediate release tablet Take 1 tablet (5 mg) by mouth every 3 hours as needed for moderate to severe pain 100 tablet 0     Prenatal Vit-Fe Fumarate-FA (PRENATAL MULTIVITAMIN  PLUS IRON) 27-0.8 MG TABS Take 1 tablet by mouth daily       PROPRANOLOL HCL PO Take 40 mg by mouth 4 times daily        sennosides (SENOKOT) 8.6 MG tablet Take 1 tablet by mouth 2 times daily       Warfarin Sodium (COUMADIN PO) Take 4 mg by mouth daily        No facility-administered encounter medications on file as of 10/19/2018.        Review Of Systems:  Skin: As above  Eyes: negative  Ears/Nose/Throat: negative  Respiratory: No shortness of breath, dyspnea on exertion, cough, or hemoptysis  Cardiovascular: negative  Gastrointestinal: negative  Genitourinary: negative  Musculoskeletal: negative  Neurologic: negative  Psychiatric: negative  Hematologic/Lymphatic/Immunologic: negative  Endocrine: negative      Objective:     BP (!) 152/103  Pulse 78  Resp 20  Breastfeeding? No  Eyes: Conjunctivae/lids: Normal   ENT: Lips:  Normal  MSK: Normal  Cardiovascular: Peripheral edema none  Pulm: Breathing Normal  Neuro/Psych: Orientation: Normal; Mood/Affect: Normal, NAD, WDWN  Pt accompanied by: self  Following areas examined:   Scalp, face, eyelids, lips, neck, chest, abdomen, back, buttocks, and R&L upper and lower extremities.      Elkins skin type:i   Findings:  Tan WD smooth macules on face, neck, trunk, and extremities.  Brown, stuck-on scaly appearing papules on trunk and extremities.  Red smooth well-defined macules on trunk and extremities.  Well circumscribed macules with symmetric color distribution on trunk and extremities 2-3mm  Inflamed brown, stuck-on scaly appearing papules on base of neck  Hypopigmented linear patch on right upper back and right thigh and ankle  wd red excoritated papule on left  mid ventral thigh 0.3cm  Assessment and Plan:  1) Lentigines, multiple benign nevi, cherry angiomas, and seborrheic keratoses    Treatment of these lesions would be purely cosmetic and not medically neccessary.  Lesion may recur and/or may not completely resolve. May need additional treatment.  Different removal options including excision, cryotherapy, cautery and /or laser.      2) ISK x 15  Benign etiology and course of lesion.     LN2: Treated with LN2 for 5s for 1-2 cycles. Warned risks of blistering, pain, pigment change, scarring, and incomplete resolution.  Advised patient to return if lesions do not completely resolve within 2-3 months.  Wound care sheet given.    3) Neoplasm of uncertain behavior left mid ventral thigh 0.3cm  Ruptured folliculitis vs BCC  TANGENTIAL BIOPSY:  After consent, anesthesia with [facility]EC and prep, tangential biopsy performed.  No complications and routine wound care.  May grow back and will get a scar. Based on lesion type may need to completely remove lesion. Patient will be notified in 7-10 days of results. Wound care directions given.    4) history of BCC and LM  Signs and Symptoms of non-melanoma skin cancer and ABCDEs of melanoma reviewed with patient. Patient encouraged to perform monthly self skin exams and educated on how to perform them. UV precautions reviewed with patient. Patient was asked about new or changing moles/lesions on body.   Wear a sunscreen with at least SPF 30 on your face, ears, neck and V of the chest daily. Wear sunscreen on other areas of the body if those areas are exposed to the sun throughout the day. Sunscreens can contain physical and/or chemical blockers. Physical blockers are less likely to clog pores, these include zinc oxide and titanium dioxide. Reapply every two hour and after swimming. Sunscreen examples include Neutrogena, CeraVe, Blue Lizard, Elta MD and many others.    5) High blood pressure reading with history of hypertension      Patient  to follow up with Primary Care provider regarding elevated blood pressure.          Follow up in 6 months for FBE      Again, thank you for allowing me to participate in the care of your patient.        Sincerely,        Dee Diana PA-C

## 2018-10-19 NOTE — MR AVS SNAPSHOT
After Visit Summary   10/19/2018    Nelda Watts    MRN: [id number]           Patient Information     Date Of Birth          1/29/1933        Visit Information        Provider Department      10/19/2018 2:00 PM Dee Diana PA-C [facility]        Care Instructions    Proper skin care from [facility]:    -Eliminate harsh soaps as they strip the natural oils from the skin, often resulting in dry itchy skin ( i.e. Dial, Zest, [place] Spring)  -Use mild soaps such as Cetaphil or Dove Sensitive Skin in the shower. You do not need to use soap on arms, legs, and trunk every time you shower unless visibly soiled.   -Avoid hot or cold showers.  -After showering, lightly dry off and apply moisturizing within 2-3 minutes. This will help trap moisture in the skin.   -Aggressive use of a moisturizer at least 1-2 times a day to the entire body (including -Vanicream, Cetaphil, Aquaphor or Cerave) and moisturize hands after every washing.  -We recommend using moisturizers that come in a tub that needs to be scooped out, not a pump. This has more of an oil base. It will hold moisture in your skin much better than a water base moisturizer. The above recommended are non-pore clogging.           Wear a sunscreen with at least SPF 30 on your face, ears, neck and V of the chest daily. Wear sunscreen on other areas of the body if those areas are exposed to the sun throughout the day. Sunscreens can contain physical and/or chemical blockers. Physical blockers are less likely to clog pores, these include zinc oxide and titanium dioxide. Reapply every two hour and after swimming. Sunscreen examples include Neutrogena, CeraVe, Blue Lizard, Elta MD and many others.    UV radiation  UVA radiation remains constant throughout the day and throughout the year. It is a longer wavelength than UVB and therefore penetrates deeper into the skin leading to immediate and delayed tanning, photoaging,  and skin cancer. 70-80% of UVA and UVB radiation occurs between the hours of 10am-2pm.  UVB radiation  UVB radiation causes the most harmful effects and is more significant during the summer months. However, snow and ice can reflect UVB radiation leading to skin damage during the winter months as well. UVB radiation is responsible for tanning, burning, inflammation, delayed erythema (pinkness), pigmentation (brown spots), and skin cancer.   Just because you do not burn or are not developing a tan does not mean that you are not damaging your skin. A 15 minute drive to and from work for 30 years an lead to chronic sun damage of the skin. It is important to wear a broad spectrum (both UVA and UVB) sunscreen EVERY day with at least 30 SPF. Apply to face, ears, neck and v of the chest as this is where most of our sun exposure is. Reapply sunscreen every two hours if you plan on being outside.   Manish Monae. Clinical Dermatology: A Color Guide to Diagnosis and Therapy. Elsevier, 2016.     WOUND CARE INSTRUCTIONS  FOR CRYOSURGERY        This area treated with liquid nitrogen will form a blister. You do not need to bandage the area until after the blister forms and breaks (which may be a few days).  When the blister breaks, begin daily dressing changes as follows:    1) Clean and dry the area with tap water using clean Q-tip or sterile gauze pad.    2) Apply Polysporin ointment or Bacitracin ointment over entire wound.  Do NOT use Neosporin ointment.    3) Cover the wound with a band-aid or sterile non-stick gauze pad and micropore paper tape.      REPEAT THESE INSTRUCTIONS AT LEAST ONCE A DAY UNTIL THE WOUND HAS COMPLETELY HEALED.        It is an old wives tale that a wound heals better when it is exposed to air and allowed to dry out. The wound will heal faster with a better cosmetic result if it is kept moist with ointment and covered with a bandage.  Do not let the wound dry out.      IMPORTANT INFORMATION ON  REVERSE SIDE    Supplies Needed:     *Cotton tipped applicators (Q-tips)   *Polysporin ointment or Bacitracin ointment (NOT NEOSPORIN)   *Band-aids, or non stick gauze pads and micropore paper tape                PATIENT INFORMATION    During the healing process you will notice a number of changes. All wounds develop a small halo of redness surrounding the wound.  This means healing is occurring. Severe itching with extensive redness usually indicates sensitivity to the ointment or bandage tape used to dress the wound.  You should call our office if this develops.      Swelling and/or discoloration around your surgical site is common, particularly when performed around the eye.    All wounds normally drain.  The larger the wound the more drainage there will be.  After 7-10 days, you will notice the wound beginning to shrink and new skin will begin to grow.  The wound is healed when you can see skin has formed over the entire area.  A healed wound has a healthy, shiny look to the surface and is red to dark pink in color to normalize.  Wounds may take approximately 4-6 weeks to heal.  Larger wounds may take 6-8 weeks.  After the wound is healed you may discontinue dressing changes.    You may experience a sensation of tightness as your wound heals. This is normal and will gradually subside.    Your healed wound may be sensitive to temperature changes. This sensitivity improves with time, but if you re having a lot of discomfort, try to avoid temperature extremes.    Patients frequently experience itching after their wound appears to have healed because of the continue healing under the skin.  Plain Vaseline will help relieve the itching.                      Wound Care Instructions     FOR SUPERFICIAL WOUNDS     [facility] [phone]    [facility] [phone]          AFTER 24 HOURS YOU SHOULD REMOVE THE BANDAGE AND BEGIN DAILY DRESSING CHANGES AS FOLLOWS:     1) Remove  Dressing.     2) Clean and dry the area with tap water using a Q-tip or sterile gauze pad.     3) Apply Vaseline, Aquaphor, Polysporin ointment or Bacitracin ointment over entire wound.  Do NOT use Neosporin ointment.     4) Cover the wound with a band-aid, or a sterile non-stick gauze pad and micropore paper tape      REPEAT THESE INSTRUCTIONS AT LEAST ONCE A DAY UNTIL THE WOUND HAS COMPLETELY HEALED.    It is an old wives tale that a wound heals better when it is exposed to air and allowed to dry out. The wound will heal faster with a better cosmetic result if it is kept moist with ointment and covered with a bandage.    **Do not let the wound dry out.**      Supplies Needed:      *Cotton tipped applicators (Q-tips)    *Polysporin Ointment or Bacitracin Ointment (NOT NEOSPORIN)    *Band-aids or non-stick gauze pads and micropore paper tape.      PATIENT INFORMATION:    During the healing process you will notice a number of changes. All wounds develop a small halo of redness surrounding the wound.  This means healing is occurring. Severe itching with extensive redness usually indicates sensitivity to the ointment or bandage tape used to dress the wound.  You should call our office if this develops.      Swelling  and/or discoloration around your surgical site is common, particularly when performed around the eye.    All wounds normally drain.  The larger the wound the more drainage there will be.  After 7-10 days, you will notice the wound beginning to shrink and new skin will begin to grow.  The wound is healed when you can see skin has formed over the entire area.  A healed wound has a healthy, shiny look to the surface and is red to dark pink in color to normalize.  Wounds may take approximately 4-6 weeks to heal.  Larger wounds may take 6-8 weeks.  After the wound is healed you may discontinue dressing changes.    You may experience a sensation of tightness as your wound heals. This is normal and will gradually  "subside.    Your healed wound may be sensitive to temperature changes. This sensitivity improves with time, but if you re having a lot of discomfort, try to avoid temperature extremes.    Patients frequently experience itching after their wound appears to have healed because of the continue healing under the skin.  Plain Vaseline will help relieve the itching.        POSSIBLE COMPLICATIONS    BLEEDIN. Leave the bandage in place.  2. Use tightly rolled up gauze or a cloth to apply direct pressure over the bandage for 30  minutes.  3. Reapply pressure for an additional 30 minutes if necessary  4. Use additional gauze and tape to maintain pressure once the bleeding has stopped.              Follow-ups after your visit        Who to contact     If you have questions or need follow up information about today's clinic visit or your schedule please contact [facility] directly at [phone].  Normal or non-critical lab and imaging results will be communicated to you by Surphacehart, letter or phone within 4 business days after the clinic has received the results. If you do not hear from us within 7 days, please contact the clinic through Surphacehart or phone. If you have a critical or abnormal lab result, we will notify you by phone as soon as possible.  Submit refill requests through [facility] or call your pharmacy and they will forward the refill request to us. Please allow 3 business days for your refill to be completed.          Additional Information About Your Visit        [facility] Information     [facility] lets you send messages to your doctor, view your test results, renew your prescriptions, schedule appointments and more. To sign up, go to [email or web address] . Click on \"Log in\" on the left side of the screen, which will take you to the Welcome page. Then click on \"Sign up Now\" on the right side of the page.     You will be asked to enter the access code listed below, as well as some personal " information. Please follow the directions to create your username and password.     Your access code is: [email or web address]  Expires: 2019  2:03 PM     Your access code will  in 90 days. If you need help or a new code, please call your [place] clinic or [phone].        Care EveryWhere ID     This is your Care EveryWhere ID. This could be used by other organizations to access your [place] medical records  [id number]        Your Vitals Were     Pulse Respirations Breastfeeding?             78 20 No          Blood Pressure from Last 3 Encounters:   10/19/18 (!) 152/103   18 (!) 221/134   09/10/17 (!) 180/117    Weight from Last 3 Encounters:   09/10/17 65.8 kg (145 lb)   11/02/15 61.2 kg (135 lb)   10/11/15 65.8 kg (145 lb)              Today, you had the following     No orders found for display       Primary Care Provider Office Phone # Fax #    Ryland Torres [phone]       [address] [address]        Equal Access to Services     [facility]: Hadii aad ku hadasho Solexali, waaxda luqadaha, qaybta kaalmada adeegyada, aram arias . So [facility] [phone].    ATENCIÓN: Si habla español, tiene a albrigth disposición servicios gratuitos de asistencia lingüística. Llame al [phone].    We comply with applicable federal civil rights laws and [place] laws. We do not discriminate on the basis of race, color, national origin, age, disability, sex, sexual orientation, or gender identity.            Thank you!     Thank you for choosing [facility]  for your care. Our goal is always to provide you with excellent care. Hearing back from our patients is one way we can continue to improve our services. Please take a few minutes to complete the written survey that you may receive in the mail after your visit with us. Thank you!             Your Updated Medication List - Protect others around you: Learn how  to safely use, store and throw away your medicines at www.disposemymeds.org.          This list is accurate as of 10/19/18  2:03 PM.  Always use your most recent med list.                   Brand Name Dispense Instructions for use Diagnosis    acetaminophen 650 MG 8 hour tablet     100 tablet    Take 650 mg by mouth every 6 hours    Status post total right knee replacement       ALLEGRA PO      Take 60 mg by mouth daily Breaks tab in 1/2 and takes 1/2 tab twice daily of single daily dose tab.        B-12 PO      Take 1,000 mg by mouth every evening Patient does not know strength        calcium carbonate 600 mg-vitamin D 400 units 600-400 MG-UNIT per tablet    CALTRATE     Take 1 tablet by mouth 2 times daily        CLONAZEPAM PO      Take 0.5 mg by mouth daily as needed for anxiety        COENZYME Q-10 PO      Take 200 mg by mouth daily        COUMADIN PO      Take 4 mg by mouth daily        cyanocobalamin 1000 MCG/ML injection    VITAMIN B12     Inject 1 mL into the muscle every 30 days Patient receives on the first week of each month        LACTOBACILLUS PO      Take 1 capsule by mouth 2 times daily        MAGNESIUM OXIDE PO      Take 500 mg by mouth At Bedtime        MELATONIN PO      Take 3 mg by mouth At Bedtime        metoclopramide 5 MG tablet    REGLAN    20 tablet    Take 1 tablet (5 mg) by mouth 3 times daily as needed        Multi-vitamin Tabs tablet      Take 1 tablet by mouth daily        OMEPRAZOLE PO      Take 20 mg by mouth 2 times daily (before meals)        oxyCODONE IR 5 MG tablet    ROXICODONE    100 tablet    Take 1 tablet (5 mg) by mouth every 3 hours as needed for moderate to severe pain    Status post total right knee replacement       prenatal multivitamin plus iron 27-0.8 MG Tabs per tablet      Take 1 tablet by mouth daily        PROPRANOLOL HCL PO      Take 40 mg by mouth 4 times daily        sennosides 8.6 MG tablet    SENOKOT     Take 1 tablet by mouth 2 times daily        ZOFRAN PO       Take 4 mg by mouth every 6 hours as needed for nausea or vomiting

## 2018-10-19 NOTE — PATIENT INSTRUCTIONS
Proper skin care from [facility]:    -Eliminate harsh soaps as they strip the natural oils from the skin, often resulting in dry itchy skin ( i.e. Dial, Zest, [place] Spring)  -Use mild soaps such as Cetaphil or Dove Sensitive Skin in the shower. You do not need to use soap on arms, legs, and trunk every time you shower unless visibly soiled.   -Avoid hot or cold showers.  -After showering, lightly dry off and apply moisturizing within 2-3 minutes. This will help trap moisture in the skin.   -Aggressive use of a moisturizer at least 1-2 times a day to the entire body (including -Vanicream, Cetaphil, Aquaphor or Cerave) and moisturize hands after every washing.  -We recommend using moisturizers that come in a tub that needs to be scooped out, not a pump. This has more of an oil base. It will hold moisture in your skin much better than a water base moisturizer. The above recommended are non-pore clogging.           Wear a sunscreen with at least SPF 30 on your face, ears, neck and V of the chest daily. Wear sunscreen on other areas of the body if those areas are exposed to the sun throughout the day. Sunscreens can contain physical and/or chemical blockers. Physical blockers are less likely to clog pores, these include zinc oxide and titanium dioxide. Reapply every two hour and after swimming. Sunscreen examples include Neutrogena, CeraVe, Blue Lizard, Elta MD and many others.    UV radiation  UVA radiation remains constant throughout the day and throughout the year. It is a longer wavelength than UVB and therefore penetrates deeper into the skin leading to immediate and delayed tanning, photoaging, and skin cancer. 70-80% of UVA and UVB radiation occurs between the hours of 10am-2pm.  UVB radiation  UVB radiation causes the most harmful effects and is more significant during the summer months. However, snow and ice can reflect UVB radiation leading to skin damage during the winter months as well. UVB  radiation is responsible for tanning, burning, inflammation, delayed erythema (pinkness), pigmentation (brown spots), and skin cancer.   Just because you do not burn or are not developing a tan does not mean that you are not damaging your skin. A 15 minute drive to and from work for 30 years an lead to chronic sun damage of the skin. It is important to wear a broad spectrum (both UVA and UVB) sunscreen EVERY day with at least 30 SPF. Apply to face, ears, neck and v of the chest as this is where most of our sun exposure is. Reapply sunscreen every two hours if you plan on being outside.   Manish Monae. Clinical Dermatology: A Color Guide to Diagnosis and Therapy. Elsevier, 2016.     WOUND CARE INSTRUCTIONS  FOR CRYOSURGERY        This area treated with liquid nitrogen will form a blister. You do not need to bandage the area until after the blister forms and breaks (which may be a few days).  When the blister breaks, begin daily dressing changes as follows:    1) Clean and dry the area with tap water using clean Q-tip or sterile gauze pad.    2) Apply Polysporin ointment or Bacitracin ointment over entire wound.  Do NOT use Neosporin ointment.    3) Cover the wound with a band-aid or sterile non-stick gauze pad and micropore paper tape.      REPEAT THESE INSTRUCTIONS AT LEAST ONCE A DAY UNTIL THE WOUND HAS COMPLETELY HEALED.        It is an old wives tale that a wound heals better when it is exposed to air and allowed to dry out. The wound will heal faster with a better cosmetic result if it is kept moist with ointment and covered with a bandage.  Do not let the wound dry out.      IMPORTANT INFORMATION ON REVERSE SIDE    Supplies Needed:     *Cotton tipped applicators (Q-tips)   *Polysporin ointment or Bacitracin ointment (NOT NEOSPORIN)   *Band-aids, or non stick gauze pads and micropore paper tape                PATIENT INFORMATION    During the healing process you will notice a number of changes. All wounds  develop a small halo of redness surrounding the wound.  This means healing is occurring. Severe itching with extensive redness usually indicates sensitivity to the ointment or bandage tape used to dress the wound.  You should call our office if this develops.      Swelling and/or discoloration around your surgical site is common, particularly when performed around the eye.    All wounds normally drain.  The larger the wound the more drainage there will be.  After 7-10 days, you will notice the wound beginning to shrink and new skin will begin to grow.  The wound is healed when you can see skin has formed over the entire area.  A healed wound has a healthy, shiny look to the surface and is red to dark pink in color to normalize.  Wounds may take approximately 4-6 weeks to heal.  Larger wounds may take 6-8 weeks.  After the wound is healed you may discontinue dressing changes.    You may experience a sensation of tightness as your wound heals. This is normal and will gradually subside.    Your healed wound may be sensitive to temperature changes. This sensitivity improves with time, but if you re having a lot of discomfort, try to avoid temperature extremes.    Patients frequently experience itching after their wound appears to have healed because of the continue healing under the skin.  Plain Vaseline will help relieve the itching.                      Wound Care Instructions     FOR SUPERFICIAL WOUNDS     [facility] [phone]    [facility] [phone]          AFTER 24 HOURS YOU SHOULD REMOVE THE BANDAGE AND BEGIN DAILY DRESSING CHANGES AS FOLLOWS:     1) Remove Dressing.     2) Clean and dry the area with tap water using a Q-tip or sterile gauze pad.     3) Apply Vaseline, Aquaphor, Polysporin ointment or Bacitracin ointment over entire wound.  Do NOT use Neosporin ointment.     4) Cover the wound with a band-aid, or a sterile non-stick gauze pad and micropore paper tape      REPEAT  THESE INSTRUCTIONS AT LEAST ONCE A DAY UNTIL THE WOUND HAS COMPLETELY HEALED.    It is an old wives tale that a wound heals better when it is exposed to air and allowed to dry out. The wound will heal faster with a better cosmetic result if it is kept moist with ointment and covered with a bandage.    **Do not let the wound dry out.**      Supplies Needed:      *Cotton tipped applicators (Q-tips)    *Polysporin Ointment or Bacitracin Ointment (NOT NEOSPORIN)    *Band-aids or non-stick gauze pads and micropore paper tape.      PATIENT INFORMATION:    During the healing process you will notice a number of changes. All wounds develop a small halo of redness surrounding the wound.  This means healing is occurring. Severe itching with extensive redness usually indicates sensitivity to the ointment or bandage tape used to dress the wound.  You should call our office if this develops.      Swelling  and/or discoloration around your surgical site is common, particularly when performed around the eye.    All wounds normally drain.  The larger the wound the more drainage there will be.  After 7-10 days, you will notice the wound beginning to shrink and new skin will begin to grow.  The wound is healed when you can see skin has formed over the entire area.  A healed wound has a healthy, shiny look to the surface and is red to dark pink in color to normalize.  Wounds may take approximately 4-6 weeks to heal.  Larger wounds may take 6-8 weeks.  After the wound is healed you may discontinue dressing changes.    You may experience a sensation of tightness as your wound heals. This is normal and will gradually subside.    Your healed wound may be sensitive to temperature changes. This sensitivity improves with time, but if you re having a lot of discomfort, try to avoid temperature extremes.    Patients frequently experience itching after their wound appears to have healed because of the continue healing under the skin.  Plain  Vaseline will help relieve the itching.        POSSIBLE COMPLICATIONS    BLEEDIN. Leave the bandage in place.  2. Use tightly rolled up gauze or a cloth to apply direct pressure over the bandage for 30  minutes.  3. Reapply pressure for an additional 30 minutes if necessary  4. Use additional gauze and tape to maintain pressure once the bleeding has stopped.

## 2018-10-23 LAB — COPATH REPORT: NORMAL

## 2018-10-24 ENCOUNTER — TELEPHONE (OUTPATIENT)
Dept: DERMATOLOGY | Facility: CLINIC | Age: 83
End: 2018-10-24

## 2018-10-24 NOTE — TELEPHONE ENCOUNTER
Called and LM for patient to call back in regards to biopsy results.    [place] RN-BSN  [facility]  [phone]

## 2018-10-24 NOTE — TELEPHONE ENCOUNTER
Notes Recorded by Dee Diana PA-C on 10/24/2018 at 8:44 AM  Skin, left mid ventral thigh:   - Hemangioma     Blood vessel growth. Ir recurs please follow up for treatment.    This is a benign lesion that does not need any additional treatment. Please let me know if you have any questions.

## 2018-10-25 NOTE — TELEPHONE ENCOUNTER
Left message on answering machine for patient to call back.  Mailed normal result letter.    Brandi OsorioRN BSN  [facility]  [phone]

## 2019-02-18 ENCOUNTER — HOSPITAL ENCOUNTER (OUTPATIENT)
Dept: MAMMOGRAPHY | Facility: CLINIC | Age: 84
Discharge: HOME | End: 2019-02-18
Attending: INTERNAL MEDICINE | Admitting: INTERNAL MEDICINE
Payer: MEDICARE

## 2019-02-18 DIAGNOSIS — Z12.31 VISIT FOR SCREENING MAMMOGRAM: ICD-10-CM

## 2019-02-18 PROCEDURE — 77063 BREAST TOMOSYNTHESIS BI: CPT

## 2019-05-03 ENCOUNTER — OFFICE VISIT (OUTPATIENT)
Dept: FAMILY MEDICINE | Facility: CLINIC | Age: 84
End: 2019-05-03
Payer: MEDICARE

## 2019-05-03 VITALS — SYSTOLIC BLOOD PRESSURE: 158 MMHG | DIASTOLIC BLOOD PRESSURE: 88 MMHG

## 2019-05-03 DIAGNOSIS — L82.0 INFLAMED SEBORRHEIC KERATOSIS: Primary | ICD-10-CM

## 2019-05-03 DIAGNOSIS — D18.01 CHERRY ANGIOMA: ICD-10-CM

## 2019-05-03 DIAGNOSIS — D48.5 NEOPLASM OF UNCERTAIN BEHAVIOR OF SKIN: ICD-10-CM

## 2019-05-03 DIAGNOSIS — D22.9 MULTIPLE BENIGN NEVI: ICD-10-CM

## 2019-05-03 DIAGNOSIS — Z85.828 HISTORY OF BASAL CELL CARCINOMA: ICD-10-CM

## 2019-05-03 DIAGNOSIS — L82.1 SEBORRHEIC KERATOSES: ICD-10-CM

## 2019-05-03 DIAGNOSIS — Z85.820 HISTORY OF MELANOMA: ICD-10-CM

## 2019-05-03 DIAGNOSIS — D22.9 MULTIPLE NEVI: ICD-10-CM

## 2019-05-03 PROCEDURE — 99214 OFFICE O/P EST MOD 30 MIN: CPT | Mod: 25 | Performed by: PHYSICIAN ASSISTANT

## 2019-05-03 PROCEDURE — 88305 TISSUE EXAM BY PATHOLOGIST: CPT | Mod: TC | Performed by: PHYSICIAN ASSISTANT

## 2019-05-03 PROCEDURE — 11102 TANGNTL BX SKIN SINGLE LES: CPT | Mod: 59 | Performed by: PHYSICIAN ASSISTANT

## 2019-05-03 PROCEDURE — 17110 DESTRUCTION B9 LES UP TO 14: CPT | Performed by: PHYSICIAN ASSISTANT

## 2019-05-03 NOTE — PROGRESS NOTES
HPI:  Nelda Watts is a 85 year old female patient here today for spot on left thigh.  Patient states this has been present for 6-12 months.  Patient reports the following symptoms: non healing .  Patient reports the following previous treatments: none.  Patient reports the following modifying factors: none.  Associated symptoms: none. Pt would like a full body exam today. Had LM 2017 on right upper back.  Patient has no other skin complaints today.  Remainder of the HPI, Meds, PMH, Allergies, FH, and SH was reviewed in chart.       Pertinent Hx:   LM left upper back 2017 and BCC thigh and ankle  Past Medical History:   Diagnosis Date     Anxiety and depression      Arrhythmia     AF     Atrial fibrillation (H)      Blastocystis hominis infection      Cholelithiasis      Cysts     in liver     Diverticulitis      Ductal carcinoma (H)     infiltrating ductal carcinoma right breast     Essential tremor      Gastro-oesophageal reflux disease      Hypertension      Hypothyroidism (acquired)      IFG (impaired fasting glucose)      Myalgia and myositis, unspecified      Nausea     chronic     OA (osteoarthritis)      Scoliosis      Stroke (H) april 2011    has had two strokes, last in april 2011, in which she started on COUMADON     Suprasellar mass      Unspecified diastolic heart failure      Uterine fibroid      Vitamin B12 deficiency        Past Surgical History:   Procedure Laterality Date     APPENDECTOMY       ARTHROPLASTY KNEE Right 9/28/2015    Procedure: ARTHROPLASTY KNEE;  Surgeon: Lucius Smith MD;  Location: [facility]     BIOPSY  1956,1997    breast     BIOPSY  1999    muscle     BREAST SURGERY      right lumpectomy 20 DEC 13     BREAST SURGERY Right     hematoma evacuation     GI SURGERY       GYN SURGERY Bilateral     salpingectomy     GYN SURGERY  1963    hysterectomy     ORTHOPEDIC SURGERY      bunionectomy     ORTHOPEDIC SURGERY      cyst removal from toe     VASCULAR SURGERY  1980,2004    vein  stripping        History reviewed. No pertinent family history.    Social History     Socioeconomic History     Marital status:      Spouse name: Not on file     Number of children: Not on file     Years of education: Not on file     Highest education level: Not on file   Occupational History     Not on file   Social Needs     Financial resource strain: Not on file     Food insecurity:     Worry: Not on file     Inability: Not on file     Transportation needs:     Medical: Not on file     Non-medical: Not on file   Tobacco Use     Smoking status: Never Smoker     Smokeless tobacco: Never Used   Substance and Sexual Activity     Alcohol use: Yes     Alcohol/week: 3.5 oz     Types: 7 Glasses of wine per week     Drug use: No     Sexual activity: Not on file   Lifestyle     Physical activity:     Days per week: Not on file     Minutes per session: Not on file     Stress: Not on file   Relationships     Social connections:     Talks on phone: Not on file     Gets together: Not on file     Attends Yarsani service: Not on file     Active member of club or organization: Not on file     Attends meetings of clubs or organizations: Not on file     Relationship status: Not on file     Intimate partner violence:     Fear of current or ex partner: Not on file     Emotionally abused: Not on file     Physically abused: Not on file     Forced sexual activity: Not on file   Other Topics Concern     Not on file   Social History Narrative     Not on file       Outpatient Encounter Medications as of 5/3/2019   Medication Sig Dispense Refill     acetaminophen 650 MG TABS Take 650 mg by mouth every 6 hours 100 tablet      calcium-vitamin D (CALTRATE) 600-400 MG-UNIT per tablet Take 1 tablet by mouth 2 times daily        CLONAZEPAM PO Take 0.5 mg by mouth daily as needed for anxiety       COENZYME Q-10 PO Take 200 mg by mouth daily       Cyanocobalamin (B-12 PO) Take 1,000 mg by mouth every evening Patient does not know strength        cyanocobalamin 1000 MCG/ML injection Inject 1 mL into the muscle every 30 days Patient receives on the first week of each month       Fexofenadine HCl (ALLEGRA PO) Take 60 mg by mouth daily Breaks tab in 1/2 and takes 1/2 tab twice daily of single daily dose tab.       LACTOBACILLUS PO Take 1 capsule by mouth 2 times daily       MAGNESIUM OXIDE PO Take 500 mg by mouth At Bedtime        MELATONIN PO Take 3 mg by mouth At Bedtime        metoclopramide (REGLAN) 5 MG tablet Take 1 tablet (5 mg) by mouth 3 times daily as needed 20 tablet 0     multivitamin, therapeutic with minerals (MULTI-VITAMIN) TABS Take 1 tablet by mouth daily       OMEPRAZOLE PO Take 20 mg by mouth 2 times daily (before meals)       Ondansetron HCl (ZOFRAN PO) Take 4 mg by mouth every 6 hours as needed for nausea or vomiting       oxyCODONE (ROXICODONE) 5 MG immediate release tablet Take 1 tablet (5 mg) by mouth every 3 hours as needed for moderate to severe pain 100 tablet 0     Prenatal Vit-Fe Fumarate-FA (PRENATAL MULTIVITAMIN  PLUS IRON) 27-0.8 MG TABS Take 1 tablet by mouth daily       PROPRANOLOL HCL PO Take 40 mg by mouth 4 times daily        sennosides (SENOKOT) 8.6 MG tablet Take 1 tablet by mouth 2 times daily       Warfarin Sodium (COUMADIN PO) Take 4 mg by mouth daily        No facility-administered encounter medications on file as of 5/3/2019.        Review Of Systems:  Skin: As above  Eyes: negative  Ears/Nose/Throat: negative  Respiratory: No shortness of breath, dyspnea on exertion, cough, or hemoptysis  Cardiovascular: negative  Gastrointestinal: negative  Genitourinary: negative  Musculoskeletal: negative  Neurologic: negative  Psychiatric: negative  Hematologic/Lymphatic/Immunologic: negative  Endocrine: negative      Objective:     /88   Eyes: Conjunctivae/lids: Normal   ENT: Lips:  Normal  MSK: Normal  Cardiovascular: Peripheral edema none  Pulm: Breathing Normal  Neuro/Psych: Orientation: Normal; Mood/Affect:  Normal, NAD, WDWN  Pt accompanied by: self  Following areas examined: Scalp, face, eyelids, lips, neck, chest, abdomen, back, buttocks, and R&L upper and lower extremities.   Elkins skin type:ii   Findings:  Red smooth well-defined macules on trunk and extremities.  Brown, stuck-on scaly appearing papules on trunk and extremities.  Well circumscribed macules with symmetric color distribution on trunk and extremities 2-3mm.  Vences WD smooth macules on face, neck, trunk, and extremities.  Dark brown and light brown irregularly pigmented smooth patch on left nipple 1.7cm biopsy 0.5cm  Smooth hypopigmented patch on right upper back   Inflamed brown, stuck-on scaly appearing papules on left thigh and lower back   Assessment and Plan:     1) Cherry angiomas, Seborrheic keratoses, Benign nevi, Lentigines     I discussed the specifics of tumor, prognosis, and genetics of benign lesions.  I explained that treatment of these lesions would be purely cosmetic and not medically neccessary.  I discussed with patient different removal options including excision, cryotherapy, cautery and /or laser.  Lesion may recur and/or may not completely resolve. May need additional treatment.     2) Neoplasm of uncertain behavior left nipple 0.5cm biopsy. Entire lesion 1.7cm  Atypical nevus vs MM  TANGENTIAL BIOPSY:  After consent, anesthesia with LEC and prep, tangential biopsy performed.  No complications and routine wound care.  May grow back and will get a scar. Based on lesion type may need to completely remove lesion. Patient will be notified in 7-10 days of results. Wound care directions given.    3) ISK x2   Benign etiology and course of lesion.  LN2: Treated with LN2 for 5s for 1-2 cycles. Warned risks of blistering, pain, pigment change, scarring, and incomplete resolution.  Advised patient to return if lesions do not completely resolve within 2-3 months.  Wound care sheet given.  4) history of LM and BCC  No evidence of  recurrence  Signs and Symptoms of non-melanoma skin cancer and ABCDEs of melanoma reviewed with patient. Patient encouraged to perform monthly self skin exams and educated on how to perform them. UV precautions reviewed with patient. Patient was asked about new or changing moles/lesions on body.   Wear a sunscreen with at least SPF 30 on your face, ears, neck and V of the chest daily. Wear sunscreen on other areas of the body if those areas are exposed to the sun throughout the day. Sunscreens can contain physical and/or chemical blockers. Physical blockers are less likely to clog pores, these include zinc oxide and titanium dioxide. Reapply every two hour and after swimming. Sunscreen examples include Neutrogena, CeraVe, Blue Lizard, Elta MD and many others.    Proper skin care from [facility]:    -Eliminate harsh soaps as they strip the natural oils from the skin, often resulting in dry itchy skin ( i.e. Dial, Zest, [place] Spring)  -Use mild soaps such as Cetaphil or Dove Sensitive Skin in the shower. You do not need to use soap on arms, legs, and trunk every time you shower unless visibly soiled.   -Avoid hot or cold showers.  -After showering, lightly dry off and apply moisturizing within 2-3 minutes. This will help trap moisture in the skin.   -Aggressive use of a moisturizer at least 1-2 times a day to the entire body (including -Vanicream, Cetaphil, Aquaphor or Cerave) and moisturize hands after every washing.  -We recommend using moisturizers that come in a tub that needs to be scooped out, not a pump. This has more of an oil base. It will hold moisture in your skin much better than a water base moisturizer. The above recommended are non-pore clogging.       Nelda to follow up with Primary Care provider regarding elevated blood pressure.          Follow up in 6-12 months

## 2019-05-03 NOTE — LETTER
5/3/2019         RE: Nelda Watts  [address]  [place] [address]        Dear Colleague,    Thank you for referring your patient, Nelda Watts, to the [facility] [place]. Please see a copy of my visit note below.    HPI:  Nelda Watts is a 85 year old female patient here today for spot on left thigh.  Patient states this has been present for 6-12 months.  Patient reports the following symptoms: non healing .  Patient reports the following previous treatments: none.  Patient reports the following modifying factors: none.  Associated symptoms: none. Pt would like a full body exam today. Had LM 2017 on right upper back.  Patient has no other skin complaints today.  Remainder of the HPI, Meds, PMH, Allergies, FH, and SH was reviewed in chart.       Pertinent Hx:   LM left upper back 2017 and BCC thigh and ankle  Past Medical History:   Diagnosis Date     Anxiety and depression      Arrhythmia     AF     Atrial fibrillation (H)      Blastocystis hominis infection      Cholelithiasis      Cysts     in liver     Diverticulitis      Ductal carcinoma (H)     infiltrating ductal carcinoma right breast     Essential tremor      Gastro-oesophageal reflux disease      Hypertension      Hypothyroidism (acquired)      IFG (impaired fasting glucose)      Myalgia and myositis, unspecified      Nausea     chronic     OA (osteoarthritis)      Scoliosis      Stroke (H) april 2011    has had two strokes, last in april 2011, in which she started on COUMADON     Suprasellar mass      Unspecified diastolic heart failure      Uterine fibroid      Vitamin B12 deficiency        Past Surgical History:   Procedure Laterality Date     APPENDECTOMY       ARTHROPLASTY KNEE Right 9/28/2015    Procedure: ARTHROPLASTY KNEE;  Surgeon: Lucius Smith MD;  Location: [facility] OR     BIOPSY  1956,1997    breast     BIOPSY  1999    muscle     BREAST SURGERY      right lumpectomy 20 DEC 13     BREAST  SURGERY Right     hematoma evacuation     GI SURGERY       GYN SURGERY Bilateral     salpingectomy     GYN SURGERY  1963    hysterectomy     ORTHOPEDIC SURGERY      bunionectomy     ORTHOPEDIC SURGERY      cyst removal from toe     VASCULAR SURGERY  1980,2004    vein stripping        History reviewed. No pertinent family history.    Social History     Socioeconomic History     Marital status:      Spouse name: Not on file     Number of children: Not on file     Years of education: Not on file     Highest education level: Not on file   Occupational History     Not on file   Social Needs     Financial resource strain: Not on file     Food insecurity:     Worry: Not on file     Inability: Not on file     Transportation needs:     Medical: Not on file     Non-medical: Not on file   Tobacco Use     Smoking status: Never Smoker     Smokeless tobacco: Never Used   Substance and Sexual Activity     Alcohol use: Yes     Alcohol/week: 3.5 oz     Types: 7 Glasses of wine per week     Drug use: No     Sexual activity: Not on file   Lifestyle     Physical activity:     Days per week: Not on file     Minutes per session: Not on file     Stress: Not on file   Relationships     Social connections:     Talks on phone: Not on file     Gets together: Not on file     Attends Pentecostal service: Not on file     Active member of club or organization: Not on file     Attends meetings of clubs or organizations: Not on file     Relationship status: Not on file     Intimate partner violence:     Fear of current or ex partner: Not on file     Emotionally abused: Not on file     Physically abused: Not on file     Forced sexual activity: Not on file   Other Topics Concern     Not on file   Social History Narrative     Not on file       Outpatient Encounter Medications as of 5/3/2019   Medication Sig Dispense Refill     acetaminophen 650 MG TABS Take 650 mg by mouth every 6 hours 100 tablet      calcium-vitamin D (CALTRATE) 600-400 MG-UNIT  per tablet Take 1 tablet by mouth 2 times daily        CLONAZEPAM PO Take 0.5 mg by mouth daily as needed for anxiety       COENZYME Q-10 PO Take 200 mg by mouth daily       Cyanocobalamin (B-12 PO) Take 1,000 mg by mouth every evening Patient does not know strength       cyanocobalamin 1000 MCG/ML injection Inject 1 mL into the muscle every 30 days Patient receives on the first week of each month       Fexofenadine HCl (ALLEGRA PO) Take 60 mg by mouth daily Breaks tab in 1/2 and takes 1/2 tab twice daily of single daily dose tab.       LACTOBACILLUS PO Take 1 capsule by mouth 2 times daily       MAGNESIUM OXIDE PO Take 500 mg by mouth At Bedtime        MELATONIN PO Take 3 mg by mouth At Bedtime        metoclopramide (REGLAN) 5 MG tablet Take 1 tablet (5 mg) by mouth 3 times daily as needed 20 tablet 0     multivitamin, therapeutic with minerals (MULTI-VITAMIN) TABS Take 1 tablet by mouth daily       OMEPRAZOLE PO Take 20 mg by mouth 2 times daily (before meals)       Ondansetron HCl (ZOFRAN PO) Take 4 mg by mouth every 6 hours as needed for nausea or vomiting       oxyCODONE (ROXICODONE) 5 MG immediate release tablet Take 1 tablet (5 mg) by mouth every 3 hours as needed for moderate to severe pain 100 tablet 0     Prenatal Vit-Fe Fumarate-FA (PRENATAL MULTIVITAMIN  PLUS IRON) 27-0.8 MG TABS Take 1 tablet by mouth daily       PROPRANOLOL HCL PO Take 40 mg by mouth 4 times daily        sennosides (SENOKOT) 8.6 MG tablet Take 1 tablet by mouth 2 times daily       Warfarin Sodium (COUMADIN PO) Take 4 mg by mouth daily        No facility-administered encounter medications on file as of 5/3/2019.        Review Of Systems:  Skin: As above  Eyes: negative  Ears/Nose/Throat: negative  Respiratory: No shortness of breath, dyspnea on exertion, cough, or hemoptysis  Cardiovascular: negative  Gastrointestinal: negative  Genitourinary: negative  Musculoskeletal: negative  Neurologic: negative  Psychiatric:  negative  Hematologic/Lymphatic/Immunologic: negative  Endocrine: negative      Objective:     /88   Eyes: Conjunctivae/lids: Normal   ENT: Lips:  Normal  MSK: Normal  Cardiovascular: Peripheral edema none  Pulm: Breathing Normal  Neuro/Psych: Orientation: Normal; Mood/Affect: Normal, NAD, WDWN  Pt accompanied by: self  Following areas examined: Scalp, face, eyelids, lips, neck, chest, abdomen, back, buttocks, and R&L upper and lower extremities.   Elkins skin type:ii   Findings:  Red smooth well-defined macules on trunk and extremities.  Brown, stuck-on scaly appearing papules on trunk and extremities.  Well circumscribed macules with symmetric color distribution on trunk and extremities 2-3mm.  Vences WD smooth macules on face, neck, trunk, and extremities.  Dark brown and light brown irregularly pigmented smooth patch on left nipple 1.7cm biopsy 0.5cm  Smooth hypopigmented patch on right upper back   Inflamed brown, stuck-on scaly appearing papules on left thigh and lower back   Assessment and Plan:     1) Cherry angiomas, Seborrheic keratoses, Benign nevi, Lentigines     I discussed the specifics of tumor, prognosis, and genetics of benign lesions.  I explained that treatment of these lesions would be purely cosmetic and not medically neccessary.  I discussed with patient different removal options including excision, cryotherapy, cautery and /or laser.  Lesion may recur and/or may not completely resolve. May need additional treatment.     2) Neoplasm of uncertain behavior left nipple 0.5cm biopsy. Entire lesion 1.7cm  Atypical nevus vs MM  TANGENTIAL BIOPSY:  After consent, anesthesia with LEC and prep, tangential biopsy performed.  No complications and routine wound care.  May grow back and will get a scar. Based on lesion type may need to completely remove lesion. Patient will be notified in 7-10 days of results. Wound care directions given.    3) ISK x2   Benign etiology and course of lesion.  LN2:  Treated with LN2 for 5s for 1-2 cycles. Warned risks of blistering, pain, pigment change, scarring, and incomplete resolution.  Advised patient to return if lesions do not completely resolve within 2-3 months.  Wound care sheet given.  4) history of LM and BCC  No evidence of recurrence  Signs and Symptoms of non-melanoma skin cancer and ABCDEs of melanoma reviewed with patient. Patient encouraged to perform monthly self skin exams and educated on how to perform them. UV precautions reviewed with patient. Patient was asked about new or changing moles/lesions on body.   Wear a sunscreen with at least SPF 30 on your face, ears, neck and V of the chest daily. Wear sunscreen on other areas of the body if those areas are exposed to the sun throughout the day. Sunscreens can contain physical and/or chemical blockers. Physical blockers are less likely to clog pores, these include zinc oxide and titanium dioxide. Reapply every two hour and after swimming. Sunscreen examples include Neutrogena, CeraVe, Blue Lizard, Elta MD and many others.    Proper skin care from [facility]:    -Eliminate harsh soaps as they strip the natural oils from the skin, often resulting in dry itchy skin ( i.e. Dial, Zest, [place] Spring)  -Use mild soaps such as Cetaphil or Dove Sensitive Skin in the shower. You do not need to use soap on arms, legs, and trunk every time you shower unless visibly soiled.   -Avoid hot or cold showers.  -After showering, lightly dry off and apply moisturizing within 2-3 minutes. This will help trap moisture in the skin.   -Aggressive use of a moisturizer at least 1-2 times a day to the entire body (including -Vanicream, Cetaphil, Aquaphor or Cerave) and moisturize hands after every washing.  -We recommend using moisturizers that come in a tub that needs to be scooped out, not a pump. This has more of an oil base. It will hold moisture in your skin much better than a water base moisturizer. The above  recommended are non-pore clogging.               Follow up in 6-12 months      Again, thank you for allowing me to participate in the care of your patient.        Sincerely,        Dee Diana PA-C

## 2019-05-03 NOTE — PATIENT INSTRUCTIONS
Proper skin care from [facility]:    -Eliminate harsh soaps as they strip the natural oils from the skin, often resulting in dry itchy skin ( i.e. Dial, Zest, [place] Spring)  -Use mild soaps such as Cetaphil or Dove Sensitive Skin in the shower. You do not need to use soap on arms, legs, and trunk every time you shower unless visibly soiled.   -Avoid hot or cold showers.  -After showering, lightly dry off and apply moisturizing within 2-3 minutes. This will help trap moisture in the skin.   -Aggressive use of a moisturizer at least 1-2 times a day to the entire body (including -Vanicream, Cetaphil, Aquaphor or Cerave) and moisturize hands after every washing.  -We recommend using moisturizers that come in a tub that needs to be scooped out, not a pump. This has more of an oil base. It will hold moisture in your skin much better than a water base moisturizer. The above recommended are non-pore clogging.           Wear a sunscreen with at least SPF 30 on your face, ears, neck and V of the chest daily. Wear sunscreen on other areas of the body if those areas are exposed to the sun throughout the day. Sunscreens can contain physical and/or chemical blockers. Physical blockers are less likely to clog pores, these include zinc oxide and titanium dioxide. Reapply every two hour and after swimming. Sunscreen examples include Neutrogena, CeraVe, Blue Lizard, Elta MD and many others.    UV radiation  UVA radiation remains constant throughout the day and throughout the year. It is a longer wavelength than UVB and therefore penetrates deeper into the skin leading to immediate and delayed tanning, photoaging, and skin cancer. 70-80% of UVA and UVB radiation occurs between the hours of 10am-2pm.  UVB radiation  UVB radiation causes the most harmful effects and is more significant during the summer months. However, snow and ice can reflect UVB radiation leading to skin damage during the winter months as well. UVB  radiation is responsible for tanning, burning, inflammation, delayed erythema (pinkness), pigmentation (brown spots), and skin cancer.       WOUND CARE INSTRUCTIONS  FOR CRYOSURGERY        This area treated with liquid nitrogen will form a blister. You do not need to bandage the area until after the blister forms and breaks (which may be a few days).  When the blister breaks, begin daily dressing changes as follows:    1) Clean and dry the area with tap water using clean Q-tip or sterile gauze pad.    2) Apply Polysporin ointment or Bacitracin ointment over entire wound.  Do NOT use Neosporin ointment.    3) Cover the wound with a band-aid or sterile non-stick gauze pad and micropore paper tape.      REPEAT THESE INSTRUCTIONS AT LEAST ONCE A DAY UNTIL THE WOUND HAS COMPLETELY HEALED.        It is an old wives tale that a wound heals better when it is exposed to air and allowed to dry out. The wound will heal faster with a better cosmetic result if it is kept moist with ointment and covered with a bandage.  Do not let the wound dry out.      Supplies Needed:     *Cotton tipped applicators (Q-tips)   *Polysporin ointment or Bacitracin ointment (NOT NEOSPORIN)   *Band-aids, or non stick gauze pads and micropore paper tape    PATIENT INFORMATION    During the healing process you will notice a number of changes. All wounds develop a small halo of redness surrounding the wound.  This means healing is occurring. Severe itching with extensive redness usually indicates sensitivity to the ointment or bandage tape used to dress the wound.  You should call our office if this develops.      Swelling and/or discoloration around your surgical site is common, particularly when performed around the eye.    All wounds normally drain.  The larger the wound the more drainage there will be.  After 7-10 days, you will notice the wound beginning to shrink and new skin will begin to grow.  The wound is healed when you can see skin has  formed over the entire area.  A healed wound has a healthy, shiny look to the surface and is red to dark pink in color to normalize.  Wounds may take approximately 4-6 weeks to heal.  Larger wounds may take 6-8 weeks.  After the wound is healed you may discontinue dressing changes.    You may experience a sensation of tightness as your wound heals. This is normal and will gradually subside.    Your healed wound may be sensitive to temperature changes. This sensitivity improves with time, but if you re having a lot of discomfort, try to avoid temperature extremes.    Patients frequently experience itching after their wound appears to have healed because of the continue healing under the skin.  Plain Vaseline will help relieve the itching.                      Wound Care Instructions     FOR SUPERFICIAL WOUNDS     [facility] [phone]    [facility] [phone]          AFTER 24 HOURS YOU SHOULD REMOVE THE BANDAGE AND BEGIN DAILY DRESSING CHANGES AS FOLLOWS:     1) Remove Dressing.     2) Clean and dry the area with tap water using a Q-tip or sterile gauze pad.     3) Apply Vaseline, Aquaphor, Polysporin ointment or Bacitracin ointment over entire wound.  Do NOT use Neosporin ointment.     4) Cover the wound with a band-aid, or a sterile non-stick gauze pad and micropore paper tape      REPEAT THESE INSTRUCTIONS AT LEAST ONCE A DAY UNTIL THE WOUND HAS COMPLETELY HEALED.    It is an old wives tale that a wound heals better when it is exposed to air and allowed to dry out. The wound will heal faster with a better cosmetic result if it is kept moist with ointment and covered with a bandage.    **Do not let the wound dry out.**      Supplies Needed:      *Cotton tipped applicators (Q-tips)    *Polysporin Ointment or Bacitracin Ointment (NOT NEOSPORIN)    *Band-aids or non-stick gauze pads and micropore paper tape.      PATIENT INFORMATION:    During the healing process you will notice a  number of changes. All wounds develop a small halo of redness surrounding the wound.  This means healing is occurring. Severe itching with extensive redness usually indicates sensitivity to the ointment or bandage tape used to dress the wound.  You should call our office if this develops.      Swelling  and/or discoloration around your surgical site is common, particularly when performed around the eye.    All wounds normally drain.  The larger the wound the more drainage there will be.  After 7-10 days, you will notice the wound beginning to shrink and new skin will begin to grow.  The wound is healed when you can see skin has formed over the entire area.  A healed wound has a healthy, shiny look to the surface and is red to dark pink in color to normalize.  Wounds may take approximately 4-6 weeks to heal.  Larger wounds may take 6-8 weeks.  After the wound is healed you may discontinue dressing changes.    You may experience a sensation of tightness as your wound heals. This is normal and will gradually subside.    Your healed wound may be sensitive to temperature changes. This sensitivity improves with time, but if you re having a lot of discomfort, try to avoid temperature extremes.    Patients frequently experience itching after their wound appears to have healed because of the continue healing under the skin.  Plain Vaseline will help relieve the itching.        POSSIBLE COMPLICATIONS    BLEEDIN. Leave the bandage in place.  2. Use tightly rolled up gauze or a cloth to apply direct pressure over the bandage for 30  minutes.  3. Reapply pressure for an additional 30 minutes if necessary  4. Use additional gauze and tape to maintain pressure once the bleeding has stopped.

## 2019-05-07 LAB — COPATH REPORT: NORMAL

## 2019-05-08 ENCOUNTER — TELEPHONE (OUTPATIENT)
Dept: FAMILY MEDICINE | Facility: CLINIC | Age: 84
End: 2019-05-08

## 2019-05-08 NOTE — TELEPHONE ENCOUNTER
Notes recorded by Dee Diana PA-C on 5/8/2019 at 7:54 AM CDT  Skin, left nipple:   - Features consistent with an irritated benign keratosis, pigmented    This is a benign lesion that does not need any additional treatment.     If irritated or grows back please follow up for cryo

## 2019-05-08 NOTE — TELEPHONE ENCOUNTER
Patient notified of test results and providers message, patient has no further questions.    Brandi SOLISRN BSN  [facility]  [phone]

## 2019-07-15 ENCOUNTER — TRANSFERRED RECORDS (OUTPATIENT)
Dept: HEALTH INFORMATION MANAGEMENT | Facility: CLINIC | Age: 84
End: 2019-07-15

## 2019-11-21 ENCOUNTER — OFFICE VISIT (OUTPATIENT)
Dept: FAMILY MEDICINE | Facility: CLINIC | Age: 84
End: 2019-11-21
Payer: MEDICARE

## 2019-11-21 VITALS — DIASTOLIC BLOOD PRESSURE: 86 MMHG | SYSTOLIC BLOOD PRESSURE: 138 MMHG

## 2019-11-21 DIAGNOSIS — D48.5 NEOPLASM OF UNCERTAIN BEHAVIOR OF SKIN: Primary | ICD-10-CM

## 2019-11-21 DIAGNOSIS — L57.8 SOLAR ELASTOSIS: ICD-10-CM

## 2019-11-21 DIAGNOSIS — Z85.828 HISTORY OF BASAL CELL CARCINOMA: ICD-10-CM

## 2019-11-21 DIAGNOSIS — L81.4 LENTIGINES: ICD-10-CM

## 2019-11-21 DIAGNOSIS — D22.9 MULTIPLE NEVI: ICD-10-CM

## 2019-11-21 DIAGNOSIS — L82.1 SEBORRHEIC KERATOSES: ICD-10-CM

## 2019-11-21 DIAGNOSIS — R58 ECCHYMOSIS: ICD-10-CM

## 2019-11-21 DIAGNOSIS — L82.0 INFLAMED SEBORRHEIC KERATOSIS: ICD-10-CM

## 2019-11-21 DIAGNOSIS — D18.01 CHERRY ANGIOMA: ICD-10-CM

## 2019-11-21 DIAGNOSIS — Z85.820 HISTORY OF MELANOMA: ICD-10-CM

## 2019-11-21 DIAGNOSIS — L57.0 ACTINIC KERATOSES: ICD-10-CM

## 2019-11-21 PROCEDURE — 88342 IMHCHEM/IMCYTCHM 1ST ANTB: CPT | Mod: TC | Performed by: PHYSICIAN ASSISTANT

## 2019-11-21 PROCEDURE — 88341 IMHCHEM/IMCYTCHM EA ADD ANTB: CPT | Mod: TC | Performed by: PHYSICIAN ASSISTANT

## 2019-11-21 PROCEDURE — 17110 DESTRUCTION B9 LES UP TO 14: CPT | Performed by: PHYSICIAN ASSISTANT

## 2019-11-21 PROCEDURE — 11102 TANGNTL BX SKIN SINGLE LES: CPT | Mod: 59 | Performed by: PHYSICIAN ASSISTANT

## 2019-11-21 PROCEDURE — 17000 DESTRUCT PREMALG LESION: CPT | Mod: 59 | Performed by: PHYSICIAN ASSISTANT

## 2019-11-21 PROCEDURE — 88305 TISSUE EXAM BY PATHOLOGIST: CPT | Mod: TC | Performed by: PHYSICIAN ASSISTANT

## 2019-11-21 PROCEDURE — 17003 DESTRUCT PREMALG LES 2-14: CPT | Mod: 59 | Performed by: PHYSICIAN ASSISTANT

## 2019-11-21 PROCEDURE — 99214 OFFICE O/P EST MOD 30 MIN: CPT | Mod: 25 | Performed by: PHYSICIAN ASSISTANT

## 2019-11-21 NOTE — LETTER
11/21/2019         RE: Nelda Watts  [address]  [place] [address]        Dear Colleague,    Thank you for referring your patient, Nelda Watts, to the [facility]. Please see a copy of my visit note below.    HPI:  Nelda Watts is a 86 year old female patient here today for moles on left breast .  Patient states this has been present for unknown.  Patient reports the following symptoms: none .  Patient reports the following previous treatments: none.  Patient reports the following modifying factors: none.  Associated symptoms: none.  Patient has no other skin complaints today.  Remainder of the HPI, Meds, PMH, Allergies, FH, and SH was reviewed in chart.    Pertinent Hx:   LM left upper back 2017 and BCC thigh and ankle  Past Medical History:   Diagnosis Date     Anxiety and depression      Arrhythmia     AF     Atrial fibrillation (H)      Blastocystis hominis infection      Cholelithiasis      Cysts     in liver     Diverticulitis      Ductal carcinoma (H)     infiltrating ductal carcinoma right breast     Essential tremor      Gastro-oesophageal reflux disease      Hypertension      Hypothyroidism (acquired)      IFG (impaired fasting glucose)      Malignant melanoma (H)      Myalgia and myositis, unspecified      Nausea     chronic     OA (osteoarthritis)      Scoliosis      Stroke (H) april 2011    has had two strokes, last in april 2011, in which she started on COUMADON     Suprasellar mass      Unspecified diastolic heart failure      Uterine fibroid      Vitamin B12 deficiency        Past Surgical History:   Procedure Laterality Date     APPENDECTOMY       ARTHROPLASTY KNEE Right 9/28/2015    Procedure: ARTHROPLASTY KNEE;  Surgeon: Lucius Smith MD;  Location: [facility] OR     BIOPSY  1956,1997    breast     BIOPSY  1999    muscle     BREAST SURGERY      right lumpectomy 20 DEC 13     BREAST SURGERY Right     hematoma evacuation     GI SURGERY        GYN SURGERY Bilateral     salpingectomy     GYN SURGERY  1963    hysterectomy     ORTHOPEDIC SURGERY      bunionectomy     ORTHOPEDIC SURGERY      cyst removal from toe     VASCULAR SURGERY  1980,2004    vein stripping        History reviewed. No pertinent family history.    Social History     Socioeconomic History     Marital status:      Spouse name: Not on file     Number of children: Not on file     Years of education: Not on file     Highest education level: Not on file   Occupational History     Not on file   Social Needs     Financial resource strain: Not on file     Food insecurity:     Worry: Not on file     Inability: Not on file     Transportation needs:     Medical: Not on file     Non-medical: Not on file   Tobacco Use     Smoking status: Never Smoker     Smokeless tobacco: Never Used   Substance and Sexual Activity     Alcohol use: Yes     Alcohol/week: 5.8 standard drinks     Types: 7 Glasses of wine per week     Drug use: No     Sexual activity: Not on file   Lifestyle     Physical activity:     Days per week: Not on file     Minutes per session: Not on file     Stress: Not on file   Relationships     Social connections:     Talks on phone: Not on file     Gets together: Not on file     Attends Sabianism service: Not on file     Active member of club or organization: Not on file     Attends meetings of clubs or organizations: Not on file     Relationship status: Not on file     Intimate partner violence:     Fear of current or ex partner: Not on file     Emotionally abused: Not on file     Physically abused: Not on file     Forced sexual activity: Not on file   Other Topics Concern     Not on file   Social History Narrative     Not on file       Outpatient Encounter Medications as of 11/21/2019   Medication Sig Dispense Refill     acetaminophen 650 MG TABS Take 650 mg by mouth every 6 hours 100 tablet      calcium-vitamin D (CALTRATE) 600-400 MG-UNIT per tablet Take 1 tablet by mouth 2 times  daily        CLONAZEPAM PO Take 0.5 mg by mouth daily as needed for anxiety       COENZYME Q-10 PO Take 200 mg by mouth daily       Cyanocobalamin (B-12 PO) Take 1,000 mg by mouth every evening Patient does not know strength       cyanocobalamin 1000 MCG/ML injection Inject 1 mL into the muscle every 30 days Patient receives on the first week of each month       Fexofenadine HCl (ALLEGRA PO) Take 60 mg by mouth daily Breaks tab in 1/2 and takes 1/2 tab twice daily of single daily dose tab.       LACTOBACILLUS PO Take 1 capsule by mouth 2 times daily       MAGNESIUM OXIDE PO Take 500 mg by mouth At Bedtime        MELATONIN PO Take 3 mg by mouth At Bedtime        metoclopramide (REGLAN) 5 MG tablet Take 1 tablet (5 mg) by mouth 3 times daily as needed 20 tablet 0     multivitamin, therapeutic with minerals (MULTI-VITAMIN) TABS Take 1 tablet by mouth daily       OMEPRAZOLE PO Take 20 mg by mouth 2 times daily (before meals)       Ondansetron HCl (ZOFRAN PO) Take 4 mg by mouth every 6 hours as needed for nausea or vomiting       oxyCODONE (ROXICODONE) 5 MG immediate release tablet Take 1 tablet (5 mg) by mouth every 3 hours as needed for moderate to severe pain 100 tablet 0     Prenatal Vit-Fe Fumarate-FA (PRENATAL MULTIVITAMIN  PLUS IRON) 27-0.8 MG TABS Take 1 tablet by mouth daily       PROPRANOLOL HCL PO Take 40 mg by mouth 4 times daily        sennosides (SENOKOT) 8.6 MG tablet Take 1 tablet by mouth 2 times daily       Warfarin Sodium (COUMADIN PO) Take 4 mg by mouth daily        No facility-administered encounter medications on file as of 11/21/2019.        Review Of Systems:  Skin: As above  Eyes: negative  Ears/Nose/Throat: negative  Respiratory: No shortness of breath, dyspnea on exertion, cough, or hemoptysis  Cardiovascular: negative  Gastrointestinal: negative  Genitourinary: negative  Musculoskeletal: negative  Neurologic: negative  Psychiatric: negative  Hematologic/Lymphatic/Immunologic:  negative  Endocrine: negative      Objective:     /86   Breastfeeding No   Eyes: Conjunctivae/lids: Normal   ENT: Lips:  Normal  MSK: Normal  Cardiovascular: Peripheral edema none  Pulm: Breathing Normal  Neuro/Psych: Orientation: Normal; Mood/Affect: Normal, NAD, WDWN  Pt accompanied by: self  Following areas examined: Scalp, face, eyelids, lips, neck, chest, abdomen, back, buttocks, and R&L upper and lower extremities. Pt defers exam of groin and genitals.   Nodes: cervical, supraclavicular, right axillary, occipital, submental, submandibular NLAD  Elkins skin type:i   Findings:  Red smooth well-defined macules on trunk and extremities.  Brown, stuck-on scaly appearing papules on trunk and extremities.  Well circumscribed macules with symmetric color distribution on trunk and extremities.  Tan WD smooth macules on face, neck, trunk, and extremities.  Dark brown/black irregularly bordered macule on left medial breast 0.2cm  Smooth linear depigmented patch on right upper back   Inflamed brown, stuck-on scaly appearing plaque on left lateral flank x 1   Rhytides, hypo/hyperpigmentation, and atrophy   Pink scaly macule/s x 2 on right ventral leg  Red/purple/blue smooth patch on right plantar foot  Assessment and Plan:     1) Cherry angiomas, Seborrheic keratoses, Benign nevi, Lentigines     I discussed the specifics of tumor, prognosis, and genetics of benign lesions.  I explained that treatment of these lesions would be purely cosmetic and not medically neccessary.  I discussed with patient different removal options including excision, cryotherapy, cautery and /or laser.  Lesion may recur and/or may not completely resolve. May need additional treatment.     2) Neoplasm of uncertain behavior left medial breast 0.2cm  LM vs atypical nevus   TANGENTIAL BIOPSY:  After consent, anesthesia with LEC and prep, tangential biopsy performed.  No complications and routine wound care.  May grow back and will get a  scar. Based on lesion type may need to completely remove lesion. Patient will be notified in 7-10 days of results. Wound care directions given.    3) ISK x 1   Benign etiology and course of lesion.  LN2: Treated with LN2 for 5s for 1-2 cycles. Warned risks of blistering, pain, pigment change, scarring, and incomplete resolution.  Advised patient to return if lesions do not completely resolve within 2-3 months.  Wound care sheet given.  4)Actinic keratoses x 2 and solar elastosis    LN2 for 5 seconds x 2. Discussed AE include hypopigmentation (white spot) and recurrence. Follow up in 2-3 months to recheck lesions. There is a risk of AKs developing into a SCC.   Treatment options include LN2 vs PDT vs Efudex. Pt elected LN2    5)  Ecchymosis   Watch and monitor. Follow up in one month if not resolved.     6) history of MM and BCC  No evidence of recurrence  LM left upper back 2017 and BCC thigh and ankle  Signs and Symptoms of non-melanoma skin cancer and ABCDEs of melanoma reviewed with patient. Patient encouraged to perform monthly self skin exams and educated on how to perform them. UV precautions reviewed with patient. Patient was asked about new or changing moles/lesions on body.   Wear a sunscreen with at least SPF 30 on your face, ears, neck and V of the chest daily. Wear sunscreen on other areas of the body if those areas are exposed to the sun throughout the day. Sunscreens can contain physical and/or chemical blockers. Physical blockers are less likely to clog pores, these include zinc oxide and titanium dioxide. Reapply every two hour and after swimming. Sunscreen examples include Neutrogena, CeraVe, Blue Lizard, Elta MD and many others.    Proper skin care from [facility]:    -Eliminate harsh soaps as they strip the natural oils from the skin, often resulting in dry itchy skin ( i.e. Dial, Zest, [place] Spring)  -Use mild soaps such as Cetaphil or Dove Sensitive Skin in the shower. You do not need  to use soap on arms, legs, and trunk every time you shower unless visibly soiled.   -Avoid hot or cold showers.  -After showering, lightly dry off and apply moisturizing within 2-3 minutes. This will help trap moisture in the skin.   -Aggressive use of a moisturizer at least 1-2 times a day to the entire body (including -Vanicream, Cetaphil, Aquaphor or Cerave) and moisturize hands after every washing.  -We recommend using moisturizers that come in a tub that needs to be scooped out, not a pump. This has more of an oil base. It will hold moisture in your skin much better than a water base moisturizer. The above recommended are non-pore clogging.               Follow up in 1 year for FBE. Sooner if aks not resolved.       Again, thank you for allowing me to participate in the care of your patient.        Sincerely,        Dee Diana PA-C

## 2019-11-21 NOTE — PATIENT INSTRUCTIONS
Wound Care Instructions     FOR SUPERFICIAL WOUNDS     [facility] [phone]    [facility] [phone]          AFTER 24 HOURS YOU SHOULD REMOVE THE BANDAGE AND BEGIN DAILY DRESSING CHANGES AS FOLLOWS:     1) Remove Dressing.     2) Clean and dry the area with tap water using a Q-tip or sterile gauze pad.     3) Apply Vaseline, Aquaphor, Polysporin ointment or Bacitracin ointment over entire wound.  Do NOT use Neosporin ointment.     4) Cover the wound with a band-aid, or a sterile non-stick gauze pad and micropore paper tape      REPEAT THESE INSTRUCTIONS AT LEAST ONCE A DAY UNTIL THE WOUND HAS COMPLETELY HEALED.    It is an old wives tale that a wound heals better when it is exposed to air and allowed to dry out. The wound will heal faster with a better cosmetic result if it is kept moist with ointment and covered with a bandage.    **Do not let the wound dry out.**      Supplies Needed:      *Cotton tipped applicators (Q-tips)    *Polysporin Ointment or Bacitracin Ointment (NOT NEOSPORIN)    *Band-aids or non-stick gauze pads and micropore paper tape.      PATIENT INFORMATION:    During the healing process you will notice a number of changes. All wounds develop a small halo of redness surrounding the wound.  This means healing is occurring. Severe itching with extensive redness usually indicates sensitivity to the ointment or bandage tape used to dress the wound.  You should call our office if this develops.      Swelling  and/or discoloration around your surgical site is common, particularly when performed around the eye.    All wounds normally drain.  The larger the wound the more drainage there will be.  After 7-10 days, you will notice the wound beginning to shrink and new skin will begin to grow.  The wound is healed when you can see skin has formed over the entire area.  A healed wound has a healthy, shiny look to the surface and is red to dark pink in color to  normalize.  Wounds may take approximately 4-6 weeks to heal.  Larger wounds may take 6-8 weeks.  After the wound is healed you may discontinue dressing changes.    You may experience a sensation of tightness as your wound heals. This is normal and will gradually subside.    Your healed wound may be sensitive to temperature changes. This sensitivity improves with time, but if you re having a lot of discomfort, try to avoid temperature extremes.    Patients frequently experience itching after their wound appears to have healed because of the continue healing under the skin.  Plain Vaseline will help relieve the itching.        POSSIBLE COMPLICATIONS    BLEEDIN. Leave the bandage in place.  2. Use tightly rolled up gauze or a cloth to apply direct pressure over the bandage for 30  minutes.  3. Reapply pressure for an additional 30 minutes if necessary  4. Use additional gauze and tape to maintain pressure once the bleeding has stopped.        WOUND CARE INSTRUCTIONS  FOR CRYOSURGERY        This area treated with liquid nitrogen will form a blister. You do not need to bandage the area until after the blister forms and breaks (which may be a few days).  When the blister breaks, begin daily dressing changes as follows:    1) Clean and dry the area with tap water using clean Q-tip or sterile gauze pad.    2) Apply Polysporin ointment or Bacitracin ointment over entire wound.  Do NOT use Neosporin ointment.    3) Cover the wound with a band-aid or sterile non-stick gauze pad and micropore paper tape.      REPEAT THESE INSTRUCTIONS AT LEAST ONCE A DAY UNTIL THE WOUND HAS COMPLETELY HEALED.        It is an old wives tale that a wound heals better when it is exposed to air and allowed to dry out. The wound will heal faster with a better cosmetic result if it is kept moist with ointment and covered with a bandage.  Do not let the wound dry out.      Supplies Needed:     *Cotton tipped applicators (Q-tips)   *Polysporin  ointment or Bacitracin ointment (NOT NEOSPORIN)   *Band-aids, or non stick gauze pads and micropore paper tape    PATIENT INFORMATION    During the healing process you will notice a number of changes. All wounds develop a small halo of redness surrounding the wound.  This means healing is occurring. Severe itching with extensive redness usually indicates sensitivity to the ointment or bandage tape used to dress the wound.  You should call our office if this develops.      Swelling and/or discoloration around your surgical site is common, particularly when performed around the eye.    All wounds normally drain.  The larger the wound the more drainage there will be.  After 7-10 days, you will notice the wound beginning to shrink and new skin will begin to grow.  The wound is healed when you can see skin has formed over the entire area.  A healed wound has a healthy, shiny look to the surface and is red to dark pink in color to normalize.  Wounds may take approximately 4-6 weeks to heal.  Larger wounds may take 6-8 weeks.  After the wound is healed you may discontinue dressing changes.    You may experience a sensation of tightness as your wound heals. This is normal and will gradually subside.    Your healed wound may be sensitive to temperature changes. This sensitivity improves with time, but if you re having a lot of discomfort, try to avoid temperature extremes.    Patients frequently experience itching after their wound appears to have healed because of the continue healing under the skin.  Plain Vaseline will help relieve the itching.             Proper skin care from [facility]:    -Eliminate harsh soaps as they strip the natural oils from the skin, often resulting in dry itchy skin ( i.e. Dial, Zest, [place] Spring)  -Use mild soaps such as Cetaphil or Dove Sensitive Skin in the shower. You do not need to use soap on arms, legs, and trunk every time you shower unless visibly soiled.   -Avoid hot or  cold showers.  -After showering, lightly dry off and apply moisturizing within 2-3 minutes. This will help trap moisture in the skin.   -Aggressive use of a moisturizer at least 1-2 times a day to the entire body (including -Vanicream, Cetaphil, Aquaphor or Cerave) and moisturize hands after every washing.  -We recommend using moisturizers that come in a tub that needs to be scooped out, not a pump. This has more of an oil base. It will hold moisture in your skin much better than a water base moisturizer. The above recommended are non-pore clogging.      Wear a sunscreen with at least SPF 30 on your face, ears, neck and V of the chest daily. Wear sunscreen on other areas of the body if those areas are exposed to the sun throughout the day. Sunscreens can contain physical and/or chemical blockers. Physical blockers are less likely to clog pores, these include zinc oxide and titanium dioxide. Reapply every two hour and after swimming. Sunscreen examples include Neutrogena, CeraVe, Blue Lizard, Elta MD and many others.    UV radiation  UVA radiation remains constant throughout the day and throughout the year. It is a longer wavelength than UVB and therefore penetrates deeper into the skin leading to immediate and delayed tanning, photoaging, and skin cancer. 70-80% of UVA and UVB radiation occurs between the hours of 10am-2pm.  UVB radiation  UVB radiation causes the most harmful effects and is more significant during the summer months. However, snow and ice can reflect UVB radiation leading to skin damage during the winter months as well. UVB radiation is responsible for tanning, burning, inflammation, delayed erythema (pinkness), pigmentation (brown spots), and skin cancer.

## 2019-11-21 NOTE — PROGRESS NOTES
HPI:  Nelda Watts is a 86 year old female patient here today for moles on left breast .  Patient states this has been present for unknown.  Patient reports the following symptoms: none .  Patient reports the following previous treatments: none.  Patient reports the following modifying factors: none.  Associated symptoms: none.  Patient has no other skin complaints today.  Remainder of the HPI, Meds, PMH, Allergies, FH, and SH was reviewed in chart.    Pertinent Hx:   LM left upper back 2017 and BCC thigh and ankle  Past Medical History:   Diagnosis Date     Anxiety and depression      Arrhythmia     AF     Atrial fibrillation (H)      Blastocystis hominis infection      Cholelithiasis      Cysts     in liver     Diverticulitis      Ductal carcinoma (H)     infiltrating ductal carcinoma right breast     Essential tremor      Gastro-oesophageal reflux disease      Hypertension      Hypothyroidism (acquired)      IFG (impaired fasting glucose)      Malignant melanoma (H)      Myalgia and myositis, unspecified      Nausea     chronic     OA (osteoarthritis)      Scoliosis      Stroke (H) april 2011    has had two strokes, last in april 2011, in which she started on COUMADON     Suprasellar mass      Unspecified diastolic heart failure      Uterine fibroid      Vitamin B12 deficiency        Past Surgical History:   Procedure Laterality Date     APPENDECTOMY       ARTHROPLASTY KNEE Right 9/28/2015    Procedure: ARTHROPLASTY KNEE;  Surgeon: Lucius Smith MD;  Location: [facility] OR     BIOPSY  1956,1997    breast     BIOPSY  1999    muscle     BREAST SURGERY      right lumpectomy 20 DEC 13     BREAST SURGERY Right     hematoma evacuation     GI SURGERY       GYN SURGERY Bilateral     salpingectomy     GYN SURGERY  1963    hysterectomy     ORTHOPEDIC SURGERY      bunionectomy     ORTHOPEDIC SURGERY      cyst removal from toe     VASCULAR SURGERY  1980,2004    vein stripping        History reviewed. No pertinent family  history.    Social History     Socioeconomic History     Marital status:      Spouse name: Not on file     Number of children: Not on file     Years of education: Not on file     Highest education level: Not on file   Occupational History     Not on file   Social Needs     Financial resource strain: Not on file     Food insecurity:     Worry: Not on file     Inability: Not on file     Transportation needs:     Medical: Not on file     Non-medical: Not on file   Tobacco Use     Smoking status: Never Smoker     Smokeless tobacco: Never Used   Substance and Sexual Activity     Alcohol use: Yes     Alcohol/week: 5.8 standard drinks     Types: 7 Glasses of wine per week     Drug use: No     Sexual activity: Not on file   Lifestyle     Physical activity:     Days per week: Not on file     Minutes per session: Not on file     Stress: Not on file   Relationships     Social connections:     Talks on phone: Not on file     Gets together: Not on file     Attends [employer] service: Not on file     Active member of club or organization: Not on file     Attends meetings of clubs or organizations: Not on file     Relationship status: Not on file     Intimate partner violence:     Fear of current or ex partner: Not on file     Emotionally abused: Not on file     Physically abused: Not on file     Forced sexual activity: Not on file   Other Topics Concern     Not on file   Social History Narrative     Not on file       Outpatient Encounter Medications as of 11/21/2019   Medication Sig Dispense Refill     acetaminophen 650 MG TABS Take 650 mg by mouth every 6 hours 100 tablet      calcium-vitamin D (CALTRATE) 600-400 MG-UNIT per tablet Take 1 tablet by mouth 2 times daily        CLONAZEPAM PO Take 0.5 mg by mouth daily as needed for anxiety       COENZYME Q-10 PO Take 200 mg by mouth daily       Cyanocobalamin (B-12 PO) Take 1,000 mg by mouth every evening Patient does not know strength       cyanocobalamin 1000 MCG/ML  injection Inject 1 mL into the muscle every 30 days Patient receives on the first week of each month       Fexofenadine HCl (ALLEGRA PO) Take 60 mg by mouth daily Breaks tab in 1/2 and takes 1/2 tab twice daily of single daily dose tab.       LACTOBACILLUS PO Take 1 capsule by mouth 2 times daily       MAGNESIUM OXIDE PO Take 500 mg by mouth At Bedtime        MELATONIN PO Take 3 mg by mouth At Bedtime        metoclopramide (REGLAN) 5 MG tablet Take 1 tablet (5 mg) by mouth 3 times daily as needed 20 tablet 0     multivitamin, therapeutic with minerals (MULTI-VITAMIN) TABS Take 1 tablet by mouth daily       OMEPRAZOLE PO Take 20 mg by mouth 2 times daily (before meals)       Ondansetron HCl (ZOFRAN PO) Take 4 mg by mouth every 6 hours as needed for nausea or vomiting       oxyCODONE (ROXICODONE) 5 MG immediate release tablet Take 1 tablet (5 mg) by mouth every 3 hours as needed for moderate to severe pain 100 tablet 0     Prenatal Vit-Fe Fumarate-FA (PRENATAL MULTIVITAMIN  PLUS IRON) 27-0.8 MG TABS Take 1 tablet by mouth daily       PROPRANOLOL HCL PO Take 40 mg by mouth 4 times daily        sennosides (SENOKOT) 8.6 MG tablet Take 1 tablet by mouth 2 times daily       Warfarin Sodium (COUMADIN PO) Take 4 mg by mouth daily        No facility-administered encounter medications on file as of 11/21/2019.        Review Of Systems:  Skin: As above  Eyes: negative  Ears/Nose/Throat: negative  Respiratory: No shortness of breath, dyspnea on exertion, cough, or hemoptysis  Cardiovascular: negative  Gastrointestinal: negative  Genitourinary: negative  Musculoskeletal: negative  Neurologic: negative  Psychiatric: negative  Hematologic/Lymphatic/Immunologic: negative  Endocrine: negative      Objective:     /86   Breastfeeding No   Eyes: Conjunctivae/lids: Normal   ENT: Lips:  Normal  MSK: Normal  Cardiovascular: Peripheral edema none  Pulm: Breathing Normal  Neuro/Psych: Orientation: Normal; Mood/Affect: Normal, NAD,  WDWN  Pt accompanied by: self  Following areas examined: Scalp, face, eyelids, lips, neck, chest, abdomen, back, buttocks, and R&L upper and lower extremities. Pt defers exam of groin and genitals.   Nodes: cervical, supraclavicular, right axillary, occipital, submental, submandibular NLAD  Elkins skin type:i   Findings:  Red smooth well-defined macules on trunk and extremities.  Brown, stuck-on scaly appearing papules on trunk and extremities.  Well circumscribed macules with symmetric color distribution on trunk and extremities.  Tan WD smooth macules on face, neck, trunk, and extremities.  Dark brown/black irregularly bordered macule on left medial breast 0.2cm  Smooth linear depigmented patch on right upper back   Inflamed brown, stuck-on scaly appearing plaque on left lateral flank x 1   Rhytides, hypo/hyperpigmentation, and atrophy   Pink scaly macule/s x 2 on right ventral leg  Red/purple/blue smooth patch on right plantar foot  Assessment and Plan:     1) Cherry angiomas, Seborrheic keratoses, Benign nevi, Lentigines     I discussed the specifics of tumor, prognosis, and genetics of benign lesions.  I explained that treatment of these lesions would be purely cosmetic and not medically neccessary.  I discussed with patient different removal options including excision, cryotherapy, cautery and /or laser.  Lesion may recur and/or may not completely resolve. May need additional treatment.     2) Neoplasm of uncertain behavior left medial breast 0.2cm  LM vs atypical nevus   TANGENTIAL BIOPSY:  After consent, anesthesia with LEC and prep, tangential biopsy performed.  No complications and routine wound care.  May grow back and will get a scar. Based on lesion type may need to completely remove lesion. Patient will be notified in 7-10 days of results. Wound care directions given.    3) ISK x 1   Benign etiology and course of lesion.  LN2: Treated with LN2 for 5s for 1-2 cycles. Warned risks of blistering,  pain, pigment change, scarring, and incomplete resolution.  Advised patient to return if lesions do not completely resolve within 2-3 months.  Wound care sheet given.  4)Actinic keratoses x 2 and solar elastosis    LN2 for 5 seconds x 2. Discussed AE include hypopigmentation (white spot) and recurrence. Follow up in 2-3 months to recheck lesions. There is a risk of AKs developing into a SCC.   Treatment options include LN2 vs PDT vs Efudex. Pt elected LN2    5)  Ecchymosis   Watch and monitor. Follow up in one month if not resolved.     6) history of MM and BCC  No evidence of recurrence  LM left upper back 2017 and BCC thigh and ankle  Signs and Symptoms of non-melanoma skin cancer and ABCDEs of melanoma reviewed with patient. Patient encouraged to perform monthly self skin exams and educated on how to perform them. UV precautions reviewed with patient. Patient was asked about new or changing moles/lesions on body.   Wear a sunscreen with at least SPF 30 on your face, ears, neck and V of the chest daily. Wear sunscreen on other areas of the body if those areas are exposed to the sun throughout the day. Sunscreens can contain physical and/or chemical blockers. Physical blockers are less likely to clog pores, these include zinc oxide and titanium dioxide. Reapply every two hour and after swimming. Sunscreen examples include Neutrogena, CeraVe, Blue Lizard, Elta MD and many others.    Proper skin care from [facility]:    -Eliminate harsh soaps as they strip the natural oils from the skin, often resulting in dry itchy skin ( i.e. Dial, Zest, [place])  -Use mild soaps such as Cetaphil or Dove Sensitive Skin in the shower. You do not need to use soap on arms, legs, and trunk every time you shower unless visibly soiled.   -Avoid hot or cold showers.  -After showering, lightly dry off and apply moisturizing within 2-3 minutes. This will help trap moisture in the skin.   -Aggressive use of a moisturizer at  least 1-2 times a day to the entire body (including -Vanicream, Cetaphil, Aquaphor or Cerave) and moisturize hands after every washing.  -We recommend using moisturizers that come in a tub that needs to be scooped out, not a pump. This has more of an oil base. It will hold moisture in your skin much better than a water base moisturizer. The above recommended are non-pore clogging.               Follow up in 1 year for FBE. Sooner if aks not resolved.

## 2019-12-04 ENCOUNTER — TELEPHONE (OUTPATIENT)
Dept: DERMATOLOGY | Facility: CLINIC | Age: 84
End: 2019-12-04

## 2019-12-04 DIAGNOSIS — D48.5 NEOPLASM OF UNCERTAIN BEHAVIOR OF SKIN: Primary | ICD-10-CM

## 2019-12-04 LAB — COPATH REPORT: NORMAL

## 2019-12-04 NOTE — TELEPHONE ENCOUNTER
----- Message from Dee Diana PA-C sent at 12/4/2019 11:47 AM CST -----  Shave, left medial breast:   - Atypical intraepidermal melanocytic proliferation ( pre melanoma)     Excision with Dr Lopez.

## 2019-12-05 NOTE — TELEPHONE ENCOUNTER
patient does not want to go to [place] for excision. She has melanoma before and was seen [facility]:    [facility]   Address: [address] [place], [place] [address]  Phone: [phone]  Fax [phone]      [facility]. [facility] and they will call patient once provider reviews path report.  Brandi POSEYRN BSN  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

## 2019-12-11 ENCOUNTER — TELEPHONE (OUTPATIENT)
Dept: FAMILY MEDICINE | Facility: CLINIC | Age: 84
End: 2019-12-11

## 2019-12-11 NOTE — TELEPHONE ENCOUNTER
This was faxed on 12/5/19. Left message that this was done and if they asked for it again.  Patient needs to call back and let me know. Called Dr. Sapp office and they are closed at 430pm

## 2019-12-11 NOTE — TELEPHONE ENCOUNTER
Reason for Call:  Other     Detailed comments: Please fax over pathology report to [phone]  Dr Sapp at the clinic on [address] [place] is having a procedure done there    Phone Number Patient can be reached at: Home number on file [phone] (home)    Best Time:     Can we leave a detailed message on this number? YES    Call taken on 12/11/2019 at 4:11 PM by Anu Mejia

## 2019-12-11 NOTE — TELEPHONE ENCOUNTER
refaxed path results to Dr. Sekou POSEY,RN BSN  [facility]  [phone]

## 2019-12-12 ENCOUNTER — TELEPHONE (OUTPATIENT)
Dept: FAMILY MEDICINE | Facility: CLINIC | Age: 84
End: 2019-12-12

## 2019-12-12 NOTE — TELEPHONE ENCOUNTER
Reason for Call:  Other call back    Detailed comments: Pt called this morning and would like a nurse working with Dee Diana to give her a call back in regards to a procedure she is going to be going through. Please give pt a call back when you can. Thank you.    Phone Number Patient can be reached at: Home number on file [phone] (home)    Best Time:     Can we leave a detailed message on this number? YES    Call taken on 12/12/2019 at 9:07 AM by Lurdes Riddle

## 2019-12-12 NOTE — TELEPHONE ENCOUNTER
Called [facility] and advised that we do not physically have the slides the [facility] pathology does- if they require an NELIA they will need to figure out how to get it to them.    Brandi POSEYRN BSN  [facility]  [phone]

## 2019-12-12 NOTE — TELEPHONE ENCOUNTER
[facility]l from  office is calling because she is wanting you to call [facility] for the slide because she states that patient is older and is not going to make patient drive to Dr.Fish robbins to sign NELIA. If have questions call [phone]

## 2019-12-12 NOTE — TELEPHONE ENCOUNTER
Reason for Call:  Other call back    Detailed comments: Fax # [phone] Please fax pathology report.  Phone [phone]   Wondering where she has her Dec. 18th at 11am?  Confused on where she should go?  Please call her back. Won't be home until 5pm today & leaves tomorrow at 9:20 am .    Phone Number Patient can be reached at: Home number on file [phone] (home)    Best Time: any    Can we leave a detailed message on this number? YES    Call taken on 12/12/2019 at 10:04 AM by Sylvia Rascon

## 2019-12-12 NOTE — TELEPHONE ENCOUNTER
Called [facility]- they want the slides sent to . advised that we do not have slides- they will need to call the  of  path lab at [phone] and ask about the process of getting the slides.  They will get the slides and schedule an consult with the patient. If surgery is indicated they will schedule that as well.  Called patient and advised of all of this. Patient does not want to go to [place] and will stay with Dr. Sapp.    Brandi POSEYRN BSN  [facility]  [phone]

## 2020-01-06 ENCOUNTER — TRANSFERRED RECORDS (OUTPATIENT)
Dept: HEALTH INFORMATION MANAGEMENT | Facility: CLINIC | Age: 85
End: 2020-01-06

## 2020-03-04 ENCOUNTER — HOSPITAL ENCOUNTER (OUTPATIENT)
Dept: MAMMOGRAPHY | Facility: CLINIC | Age: 85
Discharge: HOME | End: 2020-03-04
Attending: INTERNAL MEDICINE | Admitting: INTERNAL MEDICINE
Payer: MEDICARE

## 2020-03-04 DIAGNOSIS — Z12.31 VISIT FOR SCREENING MAMMOGRAM: ICD-10-CM

## 2020-03-04 PROCEDURE — 77067 SCR MAMMO BI INCL CAD: CPT

## 2020-05-20 NOTE — TELEPHONE ENCOUNTER
ONCOLOGY INTAKE: Records Information      APPT INFORMATION: 6/17/20 - [facility]  Referring provider:  ANTHONY Torres  Referring provider s clinic:  [facility]  Reason for visit/diagnosis:  breast cancer  Has patient been notified of appointment date and time?: Yes    RECORDS INFORMATION:  Were the records received with the referral (via [employer])? Internal referral    Has patient been seen for any external appt for this diagnosis? Yes    If yes, where? [facility]    Has patient had any imaging or procedures outside of [place] for this condition? Yes      If Yes, where? [facility]    ADDITIONAL INFORMATION:  Scheduled via call from pt's son-in-law (POA)  Date chose by pt/POA

## 2020-05-22 ENCOUNTER — TELEPHONE (OUTPATIENT)
Dept: FAMILY MEDICINE | Facility: CLINIC | Age: 85
End: 2020-05-22

## 2020-05-22 NOTE — TELEPHONE ENCOUNTER
Called and spoke to patient.    Patient asked to schedule a FSE (new lesions) w/ Dee Diana.    Scheduled next available in-office appointment.    Patient voiced understanding.    LUCY Mathew-BSN-N  [facility]  [phone]

## 2020-05-22 NOTE — TELEPHONE ENCOUNTER
Reason for Call:  Other appointment    Detailed comments: [facility]    Phone Number Patient can be reached at: Home number on file [phone] (home)    Best Time: any    Can we leave a detailed message on this number? YES    Call taken on 5/22/2020 at 3:22 PM by Germania Melgoza

## 2020-05-27 NOTE — TELEPHONE ENCOUNTER
RECORDS STATUS - BREAST    RECORDS REQUESTED FROM: Epic/CE   DATE REQUESTED: 6/17/2020    NOTES DETAILS STATUS   OFFICE NOTE from referring provider Complete   ANHTONY Torres   OFFICE NOTE from medical oncologist N/A    OFFICE NOTE from surgeon     OFFICE NOTE from radiation oncologist     DISCHARGE SUMMARY from hospital N/A    DISCHARGE REPORT from the ER N/A    OPERATIVE REPORT     MEDICATION LIST Complete [facility]   CLINICAL TRIAL TREATMENTS TO DATE     LABS     PATHOLOGY REPORTS  (Tissue diagnosis, Stage, ER/OH percentage positive and intensity of staining, HER2 IHC, FISH, and all biopsies from breast and any distant metastasis)                 Requested-   Ph: [phone]  Fax: [phone]  Mailing Address:   [facility]  [address]   [place] [address]  Tracking Number: [id number]     [facility] [id number] Report in Epic  1/6/2020    GENONOMIC TESTING     TYPE:   (Next Generation Sequencing, including Foundation One testing, and Oncotype score)     IMAGING (NEED IMAGES & REPORT)     CT SCANS     Xray Chest Complete 9/10/2017, 9/23/2013    MRI Complete MRI Breast 7/18/2018, 6/22/2017, 7/9/2015    MAMMO Complete 3/4/2020, 2/18/2019, 1/24/2018    ULTRASOUND     PET     BONE SCAN     BRAIN MRI       Action    Action Taken 6/1/2020 1:38pm   I called jamil Lutz - both phone numbers went to .     6/15/2020 2:44pm   In-basket message from Dr Cabezas:   I am supposed to see Neldacarrie Tomshannon this week and she is a transfer from [facility] (I had previously seen her there).  Could you please see if Nelda could transfer her records over asap?     I called pt Nelda- left a voicemail message on her home and mobile phone. I also sent an follow up message to Dr. Cabezas.     6/15/2020 4:09pm   I spoke to Nelda over the phone- she's going to get an email address tonight and we will touch base tomorrow morning. She's aware of [facility] needing an NELIA before her appointment on Wed.     I sent  another follow up email to Dr. Cabezas.     6/16/2020 8:37am   I spoke to pt Nelda over the phone and she provided me with an email: [email or web address]    I sent her an NELIA email requesting the form be returned by 10am this morning.     9:37am   I called Min to see if she received the email yet. She's going to call her son-in-law to check on the form.     12:35pm   I left a  for pt Min on her mobile phone. I sent another urgent email to pt's Nelda's son-in-law's email address asking if the email was received this morning.     6/16/2020 1:30pm   I faxed the signed NELIA to [facility] along with a request form.     6/16/2020 3:23pm   I called [facility] to see if they received the fax request.   Ph: [phone] - the phone went to .    I checked the rightfax folder and it states that the fax was successfully sent.     Ileana from the medical records dept at [facility] is working remotely and asked me to send the fax again to her fax: [phone]. I just sent the fax a second time and stressed that we need the records today.     6/17/2020 8:42am   I forwarded the [facility] records to Dr. Cabezas this morning and sent them urgently to HIM. I also notified Dr. Cabezas and Fiona Plunkett through an ib-message that the records were sent to Dr. Cabezas's email.

## 2020-06-03 ENCOUNTER — OFFICE VISIT (OUTPATIENT)
Dept: DERMATOLOGY | Facility: CLINIC | Age: 85
End: 2020-06-03
Payer: MEDICARE

## 2020-06-03 VITALS — HEART RATE: 86 BPM | SYSTOLIC BLOOD PRESSURE: 142 MMHG | DIASTOLIC BLOOD PRESSURE: 87 MMHG | OXYGEN SATURATION: 95 %

## 2020-06-03 DIAGNOSIS — Z85.820 HX OF MELANOMA OF SKIN: ICD-10-CM

## 2020-06-03 DIAGNOSIS — Z85.820 HISTORY OF MELANOMA: ICD-10-CM

## 2020-06-03 DIAGNOSIS — L82.0 INFLAMED SEBORRHEIC KERATOSIS: ICD-10-CM

## 2020-06-03 DIAGNOSIS — D18.01 CHERRY ANGIOMA: Primary | ICD-10-CM

## 2020-06-03 DIAGNOSIS — Z85.828 HISTORY OF NONMELANOMA SKIN CANCER: ICD-10-CM

## 2020-06-03 DIAGNOSIS — L82.1 SEBORRHEIC KERATOSES: ICD-10-CM

## 2020-06-03 DIAGNOSIS — L81.4 LENTIGINES: ICD-10-CM

## 2020-06-03 PROCEDURE — 99214 OFFICE O/P EST MOD 30 MIN: CPT | Mod: 25 | Performed by: PHYSICIAN ASSISTANT

## 2020-06-03 PROCEDURE — 17110 DESTRUCTION B9 LES UP TO 14: CPT | Performed by: PHYSICIAN ASSISTANT

## 2020-06-03 NOTE — PROGRESS NOTES
HPI:  Nelda Watts is a 87 year old female patient here today for spots on right chest and right shoulder .  Patient states this has been present for a while.  Patient reports the following symptoms: bothersome, scaly .  Patient reports the following previous treatments: none.  Patient reports the following modifying factors: none.  Associated symptoms: none.  Patient has no other skin complaints today.  Remainder of the HPI, Meds, PMH, Allergies, FH, and SH was reviewed in chart.    Pertinent Hx:   LM left upper back 2017 and BCC thigh and ankle. Left medial breast Atypical intraepidermal melanocytic proliferation excised 1/6/2020.  Past Medical History:   Diagnosis Date     Anxiety and depression      Arrhythmia     AF     Atrial fibrillation (H)      Blastocystis hominis infection      Cholelithiasis      Cysts     in liver     Diverticulitis      Ductal carcinoma (H)     infiltrating ductal carcinoma right breast     Essential tremor      Gastro-oesophageal reflux disease      Hypertension      Hypothyroidism (acquired)      IFG (impaired fasting glucose)      Malignant melanoma (H)      Myalgia and myositis, unspecified      Nausea     chronic     OA (osteoarthritis)      Scoliosis      Stroke (H) april 2011    has had two strokes, last in april 2011, in which she started on COUMADON     Suprasellar mass      Unspecified diastolic heart failure      Uterine fibroid      Vitamin B12 deficiency        Past Surgical History:   Procedure Laterality Date     APPENDECTOMY       ARTHROPLASTY KNEE Right 9/28/2015    Procedure: ARTHROPLASTY KNEE;  Surgeon: Lucius Smith MD;  Location: [facility] OR     BIOPSY  1956,1997    breast     BIOPSY  1999    muscle     BREAST SURGERY      right lumpectomy 20 DEC 13     BREAST SURGERY Right     hematoma evacuation     GI SURGERY       GYN SURGERY Bilateral     salpingectomy     GYN SURGERY  1963    hysterectomy     ORTHOPEDIC SURGERY      bunionectomy     ORTHOPEDIC SURGERY       cyst removal from toe     VASCULAR SURGERY  1980,2004    vein stripping        No family history on file.    Social History     Socioeconomic History     Marital status:      Spouse name: Not on file     Number of children: Not on file     Years of education: Not on file     Highest education level: Not on file   Occupational History     Not on file   Social Needs     Financial resource strain: Not on file     Food insecurity     Worry: Not on file     Inability: Not on file     Transportation needs     Medical: Not on file     Non-medical: Not on file   Tobacco Use     Smoking status: Never Smoker     Smokeless tobacco: Never Used   Substance and Sexual Activity     Alcohol use: Yes     Alcohol/week: 5.8 standard drinks     Types: 7 Glasses of wine per week     Drug use: No     Sexual activity: Not on file   Lifestyle     Physical activity     Days per week: Not on file     Minutes per session: Not on file     Stress: Not on file   Relationships     Social connections     Talks on phone: Not on file     Gets together: Not on file     Attends Sikh service: Not on file     Active member of club or organization: Not on file     Attends meetings of clubs or organizations: Not on file     Relationship status: Not on file     Intimate partner violence     Fear of current or ex partner: Not on file     Emotionally abused: Not on file     Physically abused: Not on file     Forced sexual activity: Not on file   Other Topics Concern     Not on file   Social History Narrative     Not on file       Outpatient Encounter Medications as of 6/3/2020   Medication Sig Dispense Refill     acetaminophen 650 MG TABS Take 650 mg by mouth every 6 hours 100 tablet      calcium-vitamin D (CALTRATE) 600-400 MG-UNIT per tablet Take 1 tablet by mouth 2 times daily        CLONAZEPAM PO Take 0.5 mg by mouth daily as needed for anxiety       COENZYME Q-10 PO Take 200 mg by mouth daily       Cyanocobalamin (B-12 PO) Take 1,000 mg by  mouth every evening Patient does not know strength       cyanocobalamin 1000 MCG/ML injection Inject 1 mL into the muscle every 30 days Patient receives on the first week of each month       Fexofenadine HCl (ALLEGRA PO) Take 60 mg by mouth daily Breaks tab in 1/2 and takes 1/2 tab twice daily of single daily dose tab.       LACTOBACILLUS PO Take 1 capsule by mouth 2 times daily       MAGNESIUM OXIDE PO Take 500 mg by mouth At Bedtime        MELATONIN PO Take 3 mg by mouth At Bedtime        metoclopramide (REGLAN) 5 MG tablet Take 1 tablet (5 mg) by mouth 3 times daily as needed 20 tablet 0     multivitamin, therapeutic with minerals (MULTI-VITAMIN) TABS Take 1 tablet by mouth daily       OMEPRAZOLE PO Take 20 mg by mouth 2 times daily (before meals)       Ondansetron HCl (ZOFRAN PO) Take 4 mg by mouth every 6 hours as needed for nausea or vomiting       oxyCODONE (ROXICODONE) 5 MG immediate release tablet Take 1 tablet (5 mg) by mouth every 3 hours as needed for moderate to severe pain 100 tablet 0     Prenatal Vit-Fe Fumarate-FA (PRENATAL MULTIVITAMIN  PLUS IRON) 27-0.8 MG TABS Take 1 tablet by mouth daily       PROPRANOLOL HCL PO Take 40 mg by mouth 4 times daily        sennosides (SENOKOT) 8.6 MG tablet Take 1 tablet by mouth 2 times daily       Warfarin Sodium (COUMADIN PO) Take 4 mg by mouth daily        No facility-administered encounter medications on file as of 6/3/2020.        Review Of Systems:  Skin: spots on chest and shoulder  Eyes: negative  Ears/Nose/Throat: negative  Respiratory: No shortness of breath, dyspnea on exertion, cough, or hemoptysis  Cardiovascular: negative  Gastrointestinal: negative  Genitourinary: negative  Musculoskeletal: negative  Neurologic: negative  Psychiatric: negative  Hematologic/Lymphatic/Immunologic: negative  Endocrine: negative      Objective:     BP (!) 160/90   Pulse 86   SpO2 95%   Breastfeeding No   Eyes: Conjunctivae/lids: Normal   ENT: Lips:  Normal  MSK:  Normal  Cardiovascular: Peripheral edema none  Pulm: Breathing Normal  Neuro/Psych: Orientation: A/O x 3. Normal; Mood/Affect: Normal, NAD, WDWN  Pt accompanied by: self  Nodes: axillary, inguinal creases NLAD  Following areas examined: Scalp, face, eyelids, lips, neck, chest, abdomen, back, and R&L upper and lower extremities. Pt defers exam of buttock, groin and genitals.   Elkins skin type:i   Findings:  Red smooth well-defined macules on trunk and extremities.  Brown, stuck-on scaly appearing papules on trunk and extremities.  Well circumscribed macules with symmetric color distribution on trunk and extremities.  Tan WD smooth macules on face, neck, trunk, and extremities.  Inflamed brown, stuck-on scaly appearing papules on chest and right anterior shoulder x 7  Smooth  patch on left chest, back, thigh, ankle    Assessment and Plan:     1) Cherry angiomas, Seborrheic keratoses, Benign nevi, Lentigines     I discussed the specifics of tumor, prognosis, and genetics of benign lesions.  I explained that treatment of these lesions would be purely cosmetic and not medically neccessary.  I discussed with patient different removal options including excision, cryotherapy, cautery and /or laser.  Lesion may recur and/or may not completely resolve. May need additional treatment.     2) ISK x7  Benign etiology and course of lesion.  LN2: Treated with LN2 for 5s for 1-2 cycles. Warned risks of blistering, pain, pigment change, scarring, and incomplete resolution.  Advised patient to return if lesions do not completely resolve within 2-3 months.  Wound care sheet given.  3) history of LM, NMSC and Atypical intraepidermal melanocytic proliferation  No evidence of recurrence  Signs and Symptoms of non-melanoma skin cancer and ABCDEs of melanoma reviewed with patient. Patient encouraged to perform monthly self skin exams and educated on how to perform them. UV precautions reviewed with patient. Patient was asked about new  or changing moles/lesions on body.   Wear a sunscreen with at least SPF 30 on your face, ears, neck and V of the chest daily. Wear sunscreen on other areas of the body if those areas are exposed to the sun throughout the day. Sunscreens can contain physical and/or chemical blockers. Physical blockers are less likely to clog pores, these include zinc oxide and titanium dioxide. Reapply every two hour and after swimming. Sunscreen examples include Neutrogena, CeraVe, Blue Lizard, Elta MD and many others.    Proper skin care from [facility]:    -Eliminate harsh soaps as they strip the natural oils from the skin, often resulting in dry itchy skin ( i.e. Dial, Zest, [place] Spring)  -Use mild soaps such as Cetaphil or Dove Sensitive Skin in the shower. You do not need to use soap on arms, legs, and trunk every time you shower unless visibly soiled.   -Avoid hot or cold showers.  -After showering, lightly dry off and apply moisturizing within 2-3 minutes. This will help trap moisture in the skin.   -Aggressive use of a moisturizer at least 1-2 times a day to the entire body (including -Vanicream, Cetaphil, Aquaphor or Cerave) and moisturize hands after every washing.  -We recommend using moisturizers that come in a tub that needs to be scooped out, not a pump. This has more of an oil base. It will hold moisture in your skin much better than a water base moisturizer. The above recommended are non-pore clogging.               Follow up in 6 months

## 2020-06-03 NOTE — PATIENT INSTRUCTIONS
Proper skin care from [facility]:    -Eliminate harsh soaps as they strip the natural oils from the skin, often resulting in dry itchy skin ( i.e. Dial, Zest, [place] Spring)  -Use mild soaps such as Cetaphil or Dove Sensitive Skin in the shower. You do not need to use soap on arms, legs, and trunk every time you shower unless visibly soiled.   -Avoid hot or cold showers.  -After showering, lightly dry off and apply moisturizing within 2-3 minutes. This will help trap moisture in the skin.   -Aggressive use of a moisturizer at least 1-2 times a day to the entire body (including -Vanicream, Cetaphil, Aquaphor or Cerave) and moisturize hands after every washing.  -We recommend using moisturizers that come in a tub that needs to be scooped out, not a pump. This has more of an oil base. It will hold moisture in your skin much better than a water base moisturizer. The above recommended are non-pore clogging.      Wear a sunscreen with at least SPF 30 on your face, ears, neck and V of the chest daily. Wear sunscreen on other areas of the body if those areas are exposed to the sun throughout the day. Sunscreens can contain physical and/or chemical blockers. Physical blockers are less likely to clog pores, these include zinc oxide and titanium dioxide. Reapply every two hour and after swimming. Sunscreen examples include Neutrogena, CeraVe, Blue Lizard, Elta MD and many others.    UV radiation  UVA radiation remains constant throughout the day and throughout the year. It is a longer wavelength than UVB and therefore penetrates deeper into the skin leading to immediate and delayed tanning, photoaging, and skin cancer. 70-80% of UVA and UVB radiation occurs between the hours of 10am-2pm.  UVB radiation  UVB radiation causes the most harmful effects and is more significant during the summer months. However, snow and ice can reflect UVB radiation leading to skin damage during the winter months as well. UVB radiation is  responsible for tanning, burning, inflammation, delayed erythema (pinkness), pigmentation (brown spots), and skin cancer.     WOUND CARE INSTRUCTIONS  FOR CRYOSURGERY        This area treated with liquid nitrogen will form a blister. You do not need to bandage the area until after the blister forms and breaks (which may be a few days).  When the blister breaks, begin daily dressing changes as follows:    1) Clean and dry the area with tap water using clean Q-tip or sterile gauze pad.    2) Apply Polysporin ointment or Bacitracin ointment over entire wound.  Do NOT use Neosporin ointment.    3) Cover the wound with a band-aid or sterile non-stick gauze pad and micropore paper tape.      REPEAT THESE INSTRUCTIONS AT LEAST ONCE A DAY UNTIL THE WOUND HAS COMPLETELY HEALED.        It is an old wives tale that a wound heals better when it is exposed to air and allowed to dry out. The wound will heal faster with a better cosmetic result if it is kept moist with ointment and covered with a bandage.  Do not let the wound dry out.      Supplies Needed:     *Cotton tipped applicators (Q-tips)   *Polysporin ointment or Bacitracin ointment (NOT NEOSPORIN)   *Band-aids, or non stick gauze pads and micropore paper tape    PATIENT INFORMATION    During the healing process you will notice a number of changes. All wounds develop a small halo of redness surrounding the wound.  This means healing is occurring. Severe itching with extensive redness usually indicates sensitivity to the ointment or bandage tape used to dress the wound.  You should call our office if this develops.      Swelling and/or discoloration around your surgical site is common, particularly when performed around the eye.    All wounds normally drain.  The larger the wound the more drainage there will be.  After 7-10 days, you will notice the wound beginning to shrink and new skin will begin to grow.  The wound is healed when you can see skin has formed over the  entire area.  A healed wound has a healthy, shiny look to the surface and is red to dark pink in color to normalize.  Wounds may take approximately 4-6 weeks to heal.  Larger wounds may take 6-8 weeks.  After the wound is healed you may discontinue dressing changes.    You may experience a sensation of tightness as your wound heals. This is normal and will gradually subside.    Your healed wound may be sensitive to temperature changes. This sensitivity improves with time, but if you re having a lot of discomfort, try to avoid temperature extremes.    Patients frequently experience itching after their wound appears to have healed because of the continue healing under the skin.  Plain Vaseline will help relieve the itching.

## 2020-06-04 PROCEDURE — 00000346 ZZHCL STATISTIC REVIEW OUTSIDE SLIDES TC 88321: Performed by: INTERNAL MEDICINE

## 2020-06-04 NOTE — TELEPHONE ENCOUNTER
Action 06/04/20 8:48 AM - Nevaeh   Action Taken  Pathology received from [facility] and sent to be reviewed with filled out pathology form.

## 2020-06-05 LAB — COPATH REPORT: NORMAL

## 2020-06-16 ENCOUNTER — TELEPHONE (OUTPATIENT)
Dept: ONCOLOGY | Facility: CLINIC | Age: 85
End: 2020-06-16

## 2020-06-16 NOTE — TELEPHONE ENCOUNTER
"Patient is currently scheduled for an appointment at [facility] in [place].  Called patient to review current visitor restrictions and complete COVID-19 Patient Infection/Travel Screening Tool.     Due to the recent public health concerns around COVID-19 and in an effort to keep our patients and staff safe and healthy, we are implementing a screening process for the patients that come to our clinic.      I am going to ask you a few questions, please answer yes or no.  Your honesty about any symptoms is critical, as it keeps patients and staff healthy.      Do you have a:  Fever (or reported chills)?  No  New cough (started within the past 14 days)?  No  New shortness of breath (started within the past 14 days)?  No  Rash?  No    In the last month, have you been in contact with someone who was confirmed or suspected to have Coronavirus/COVID-19?  No    Have you traveled internationally in the last month?  No  If so, where?  N/A     I also wanted to let you know that to protect our patients from the flu and other common illnesses, [facility] enforce visitor restrictions year round, but due to the community spread of COVID-19 in [place], we are taking additional precautionary steps to ensure the health of our patients.  At this time, NO visitors are allowed on our hospital and clinic campuses.     Patient PASSED the screening assessment.    Patient instructed to come to the clinic as planned for their scheduled appointment and to call the clinic if any symptoms develop prior to their appointment.     \"Per CDC Guidelines, we are asking all patients that are coming into the building to wear a cloth covering that covers your mouth and nose.  You will be screened again at the entrance to the clinic for any Covid 19 symptoms. If you screen positive to any Covid 19 symptoms during our screening process you will be provided a surgical mask to wear during your time in the " "building.\"    \"COVID-19 is contagious and can be dangerous for our patients and staff.  Please send us a [employer] [employer] message or call our clinic before coming in if you feel any of the following symptoms: fever, cough, congestion, runny nose, sore throat, muscle aches and pains, or shortness of breath.  If you are already at our clinic, it is very important that you be honest about any symptoms you are experiencing to ensure your safety and that of other patients and staff who treat you.  If you do have symptoms, we will have a nurse and/or provider asses you to determine next steps.\"    Shari J. Schoenberger, JUSTUS on 6/16/2020 at 3:17 PM  \    "

## 2020-06-16 NOTE — TELEPHONE ENCOUNTER
Called Nelda left message on her voice mail with directions to clinic and parking policy. Records should be sent from [facility] later today. [employer] patient to wear mask and no visitors.

## 2020-06-17 ENCOUNTER — ONCOLOGY VISIT (OUTPATIENT)
Dept: ONCOLOGY | Facility: CLINIC | Age: 85
End: 2020-06-17
Attending: INTERNAL MEDICINE
Payer: MEDICARE

## 2020-06-17 ENCOUNTER — PRE VISIT (OUTPATIENT)
Dept: ONCOLOGY | Facility: CLINIC | Age: 85
End: 2020-06-17

## 2020-06-17 VITALS
SYSTOLIC BLOOD PRESSURE: 145 MMHG | DIASTOLIC BLOOD PRESSURE: 84 MMHG | OXYGEN SATURATION: 97 % | RESPIRATION RATE: 16 BRPM | HEART RATE: 79 BPM | TEMPERATURE: 98.3 F

## 2020-06-17 DIAGNOSIS — Z85.3 PERSONAL HISTORY OF MALIGNANT NEOPLASM OF BREAST: Primary | ICD-10-CM

## 2020-06-17 DIAGNOSIS — I10 ESSENTIAL HYPERTENSION: ICD-10-CM

## 2020-06-17 PROCEDURE — 99214 OFFICE O/P EST MOD 30 MIN: CPT | Performed by: INTERNAL MEDICINE

## 2020-06-17 PROCEDURE — G0463 HOSPITAL OUTPT CLINIC VISIT: HCPCS

## 2020-06-17 RX ORDER — APIXABAN 2.5 MG/1
TABLET, FILM COATED ORAL
COMMUNITY
Start: 2020-06-16

## 2020-06-17 ASSESSMENT — PAIN SCALES - GENERAL: PAINLEVEL: NO PAIN (0)

## 2020-06-17 NOTE — LETTER
"    6/17/2020         RE: Nelda Watts  [address]  [place] [address]        Dear Colleague,    Thank you for referring your patient, Nelda Watts, to the [facility]. Please see a copy of my visit note below.    Oncology Rooming Note    June 17, 2020 11:27 AM   Nelda Watts is a 87 year old female who presents for:    Chief Complaint   Patient presents with     Oncology Clinic Visit     Initial Vitals: BP (!) 145/84   Pulse 79   Temp 98.3  F (36.8  C) (Oral)   Resp 16   SpO2 97%  Estimated body mass index is 24.13 kg/m  as calculated from the following:    Height as of 9/10/17: 1.651 m (5' 5\").    Weight as of 9/10/17: 65.8 kg (145 lb). There is no height or weight on file to calculate BSA.  No Pain (0) Comment: Data Unavailable   No LMP recorded. Patient has had a hysterectomy.  Allergies reviewed: Yes  Medications reviewed: Yes    Medications: MEDICATION REFILLS NEEDED TODAY. Provider was notified.     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    HISTORY OF PRESENT ILLNESS: Nelda Watts is a 87 year old female who presents with the following oncologic history:  1.  8/15/2013: Bilateral screening mammogram showed no suspicious abnormalities.  2.  11/06/2013: Patient presented to her primary care physician with a right breast lump.  3.  11/07/2013: Right breast ultrasound showed an ill-defined hypoechoic mass measuring 1.1 x 0.9 x 1.1 cm at the 5 o'clock position, 7 cm from the nipple.  Biopsy showed a strongly ER positive (99%), RI positive (6%), HER-2/lili negative by FISH infiltrating ductal carcinoma, grade 2 with angiolymphatic invasion " absent.  4.  12/20/2013: Right breast lumpectomies were performed.  There were 3 separate areas involved.  First lumpectomy at the medial inferior quadrant revealed an infiltrating ductal carcinoma, grade 2 measuring 1.8 cm.  A second tumor measured 0.3 cm and infiltrating ductal carcinoma, grade 2 with associated solid papillary carcinoma.  The third lesion revealed a grade 2 infiltrating ductal carcinoma measuring 0.1 cm.  All surgical margins negative.  5.  1/22/2014: Bone density DEXA scan showed osteopenia of the hips and osteoporosis at the radius.  Adjuvant anastrozole with zoledronic acid recommended.  Patient did not tolerate the first zoledronic acid well.  She elected not to start anastrozole.  6.  3/24/2014: Completion of adjuvant radiation therapy, 20 treatments, 4960 cGy.  7.  6/04/2014: Commenced anastrozole, complicated by nausea, myalgias and arthralgias.  She self discontinued this.  8.  3/16/2015: Try letrozole but developed nausea, arthralgias, and dizziness.  9.  7/23/2015: Switched to exemestane but discontinued after developing diffuse myalgias, nausea and flulike symptoms.    INTERIM HISTORY:  Nelda Watts reports feeling overall well. She denies fevers, chills, night sweats, or breast complaints. She is residing at a halfway home but plans to move to [place] this fall to be closer to her children and grandchildren.       REVIEW OF SYSTEMS:   14 point ROS was reviewed and is negative other than as noted above in HPI.       HOME MEDICATIONS:  Current Outpatient Medications   Medication Sig Dispense Refill     acetaminophen 650 MG TABS Take 650 mg by mouth every 6 hours 100 tablet      calcium-vitamin D (CALTRATE) 600-400 MG-UNIT per tablet Take 1 tablet by mouth 2 times daily        CLONAZEPAM PO Take 0.5 mg by mouth daily as needed for anxiety       COENZYME Q-10 PO Take 200 mg by mouth daily       Cyanocobalamin (B-12 PO) Take 1,000 mg by mouth every evening Patient does not know  strength       cyanocobalamin 1000 MCG/ML injection Inject 1 mL into the muscle every 30 days Patient receives on the first week of each month       ELIQUIS ANTICOAGULANT 2.5 MG tablet        Fexofenadine HCl (ALLEGRA PO) Take 60 mg by mouth daily Breaks tab in 1/2 and takes 1/2 tab twice daily of single daily dose tab.       LACTOBACILLUS PO Take 1 capsule by mouth 2 times daily       MAGNESIUM OXIDE PO Take 500 mg by mouth At Bedtime        MELATONIN PO Take 3 mg by mouth At Bedtime        metoclopramide (REGLAN) 5 MG tablet Take 1 tablet (5 mg) by mouth 3 times daily as needed 20 tablet 0     multivitamin, therapeutic with minerals (MULTI-VITAMIN) TABS Take 1 tablet by mouth daily       OMEPRAZOLE PO Take 20 mg by mouth 2 times daily (before meals)       Ondansetron HCl (ZOFRAN PO) Take 4 mg by mouth every 6 hours as needed for nausea or vomiting       PROPRANOLOL HCL PO Take 40 mg by mouth 4 times daily        oxyCODONE (ROXICODONE) 5 MG immediate release tablet Take 1 tablet (5 mg) by mouth every 3 hours as needed for moderate to severe pain (Patient not taking: Reported on 6/17/2020) 100 tablet 0         ALLERGIES:  Allergies   Allergen Reactions     Anastrozole Nausea     Citalopram Nausea     Clonidine      Contrast Dye      Iodinated contrast     Hydralazine Nausea and Vomiting and GI Disturbance     Nifedipine Other (See Comments)     angioedema     No Clinical Screening - See Comments Other (See Comments)     Nitorimidazoles- paresthesias     Sulfa Drugs      Tetanus Immune Globulin      Vasotec          PAST MEDICAL HISTORY:  Past Medical History:   Diagnosis Date     Anxiety and depression      Arrhythmia     AF     Atrial fibrillation (H)      Blastocystis hominis infection      Cholelithiasis      Cysts     in liver     Diverticulitis      Ductal carcinoma (H)     infiltrating ductal carcinoma right breast     Essential tremor      Gastro-oesophageal reflux disease      Hypertension       Hypothyroidism (acquired)      IFG (impaired fasting glucose)      Malignant melanoma (H)      Myalgia and myositis, unspecified      Nausea     chronic     OA (osteoarthritis)      Scoliosis      Stroke (H) april 2011    has had two strokes, last in april 2011, in which she started on COUMADON     Suprasellar mass      Unspecified diastolic heart failure      Uterine fibroid      Vitamin B12 deficiency      Gynecologic history: Age of menarche at 13.  G6, P2 with age of first live birth at 20.  Hysterectomy in 1953.  Previously took HRT low-dose for several years but discontinued.    PAST SURGICAL HISTORY:  Past Surgical History:   Procedure Laterality Date     APPENDECTOMY       ARTHROPLASTY KNEE Right 9/28/2015    Procedure: ARTHROPLASTY KNEE;  Surgeon: Lucius Smith MD;  Location: [facility]     BIOPSY  1956,1997    breast     BIOPSY  1999    muscle     BREAST SURGERY      right lumpectomy 20 DEC 13     BREAST SURGERY Right     hematoma evacuation     GI SURGERY       GYN SURGERY Bilateral     salpingectomy     GYN SURGERY  1963    hysterectomy     ORTHOPEDIC SURGERY      bunionectomy     ORTHOPEDIC SURGERY      cyst removal from toe     VASCULAR SURGERY  1980,2004    vein stripping         SOCIAL HISTORY:  Social History     Socioeconomic History     Marital status:      Spouse name: Not on file     Number of children: Not on file     Years of education: Not on file     Highest education level: Not on file   Occupational History     Not on file   Social Needs     Financial resource strain: Not on file     Food insecurity     Worry: Not on file     Inability: Not on file     Transportation needs     Medical: Not on file     Non-medical: Not on file   Tobacco Use     Smoking status: Never Smoker     Smokeless tobacco: Never Used   Substance and Sexual Activity     Alcohol use: Yes     Alcohol/week: 5.8 standard drinks     Types: 7 Glasses of wine per week     Drug use: No     Sexual activity: Not on file    Lifestyle     Physical activity     Days per week: Not on file     Minutes per session: Not on file     Stress: Not on file   Relationships     Social connections     Talks on phone: Not on file     Gets together: Not on file     Attends [employer] service: Not on file     Active member of club or organization: Not on file     Attends meetings of clubs or organizations: Not on file     Relationship status: Not on file     Intimate partner violence     Fear of current or ex partner: Not on file     Emotionally abused: Not on file     Physically abused: Not on file     Forced sexual activity: Not on file   Other Topics Concern     Not on file   Social History Narrative     Not on file     She is  and retired.  [facility]    FAMILY HISTORY:  Maternal uncle had breast cancer but never tested for any genetic mutation.  Grandmother had ovarian cancer at an older age.    PHYSICAL EXAM:  Vital signs:  BP (!) 145/84   Pulse 79   Temp 98.3  F (36.8  C) (Oral)   Resp 16   SpO2 97%    ECO  GENERAL/CONSTITUTIONAL: No acute distress.  EYES: No scleral icterus.  ENT/MOUTH: Neck supple. Oropharynx clear, no mucositis.  LYMPH: No cervical, supraclavicular, axillary or epitrochlear adenopathy.   BREAST: No palpable discrete masses in either breast.  Stable concavity in the right lower breast from prior lumpectomy;  Nipples everted without discharge. No erythema or ulceration.  RESPIRATORY: No cough or labored breathing.   CARDIOVASCULAR: No PMI displacement.  GASTROINTESTINAL: No hepatosplenomegaly, masses, or tenderness. No guarding.  No distention.  MUSCULOSKELETAL: Warm and well-perfused, no cyanosis, clubbing, or edema.  NEUROLOGIC: No focal motor deficits. Alert, oriented, answers questions appropriately.  INTEGUMENTARY: No rashes or jaundice. Dry skin over legs bilaterally.  GAIT: Unsteady, required 1-person assistance to exam table.      LABS:  CBC RESULTS:   Recent Labs   Lab Test 09/10/17  0930    WBC 5.1   RBC 4.93   HGB 14.1   HCT 42.2   MCV 86   MCH 28.6   MCHC 33.4   RDW 14.1          Recent Labs   Lab Test 09/10/17  0930 11/02/15  1510    138   POTASSIUM 3.6 3.8   CHLORIDE 104 101   CO2 30 30   ANIONGAP 6 7   * 101*   BUN 14 11   CR 0.58 0.56   ELICEO 8.9 8.7         PATHOLOGY:  Reviewed as per HPI.    IMAGING:  3/04/2020: Mammogram reviewed and showed no suspicious findings.    ASSESSMENT/PLAN:  Nelda Watts is a 87 year old female with the following issues:  1.  Stage IA, [id number], grade 2 invasive ductal carcinoma of the right lower inner breast, strongly ER positive, IL positive, HER-2/lili FISH negative, status post lumpectomies  -I discussed with Nelda that she has no clinical evidence for recurrent breast cancer by physical exam or mammogram from 3/2020.  -She is not on any hormone blockade therapy due to intolerance of multiple treatments.  -Continue annual mammograms, next due 3/2021.    2.  Essential hypertension  -Stable.  She only tolerates propanolol.    She is moving to [place] in the fall so I encouraged her to establish care with a primary care physician there after her move.    Emerald Cabezas MD  Hematology/Oncology  [facility]    I spent a total of 25 minutes with the patient, with greater than 50% of the time in counseling and coordination of care.      Again, thank you for allowing me to participate in the care of your patient.        Sincerely,        Emerald Cabezas MD

## 2020-06-17 NOTE — PROGRESS NOTES
"Oncology Rooming Note    June 17, 2020 11:27 AM   Nelda Watts is a 87 year old female who presents for:    Chief Complaint   Patient presents with     Oncology Clinic Visit     Initial Vitals: BP (!) 145/84   Pulse 79   Temp 98.3  F (36.8  C) (Oral)   Resp 16   SpO2 97%  Estimated body mass index is 24.13 kg/m  as calculated from the following:    Height as of 9/10/17: 1.651 m (5' 5\").    Weight as of 9/10/17: 65.8 kg (145 lb). There is no height or weight on file to calculate BSA.  No Pain (0) Comment: Data Unavailable   No LMP recorded. Patient has had a hysterectomy.  Allergies reviewed: Yes  Medications reviewed: Yes    Medications: MEDICATION REFILLS NEEDED TODAY. Provider was notified.     Pharmacy name entered into EPIC:    [facility]    Clinical concerns: Refill of Omeprazole      Yumiko Simons CMA            "

## 2020-06-17 NOTE — PROGRESS NOTES
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    HISTORY OF PRESENT ILLNESS: Nelda Watts is a 87 year old female who presents with the following oncologic history:  1.  8/15/2013: Bilateral screening mammogram showed no suspicious abnormalities.  2.  11/06/2013: Patient presented to her primary care physician with a right breast lump.  3.  11/07/2013: Right breast ultrasound showed an ill-defined hypoechoic mass measuring 1.1 x 0.9 x 1.1 cm at the 5 o'clock position, 7 cm from the nipple.  Biopsy showed a strongly ER positive (99%), OK positive (6%), HER-2/lili negative by FISH infiltrating ductal carcinoma, grade 2 with angiolymphatic invasion absent.  4.  12/20/2013: Right breast lumpectomies were performed.  There were 3 separate areas involved.  First lumpectomy at the medial inferior quadrant revealed an infiltrating ductal carcinoma, grade 2 measuring 1.8 cm.  A second tumor measured 0.3 cm and infiltrating ductal carcinoma, grade 2 with associated solid papillary carcinoma.  The third lesion revealed a grade 2 infiltrating ductal carcinoma measuring 0.1 cm.  All surgical margins negative.  5.  1/22/2014: Bone density DEXA scan showed osteopenia of the hips and osteoporosis at the radius.  Adjuvant anastrozole with zoledronic acid recommended.  Patient did not tolerate the first zoledronic acid well.  She elected not to start anastrozole.  6.  3/24/2014: Completion of adjuvant radiation therapy, 20 treatments, 4960 cGy.  7.  6/04/2014: Commenced anastrozole, complicated by nausea, myalgias and arthralgias.  She self discontinued this.  8.  3/16/2015: Try letrozole but developed nausea, arthralgias, and dizziness.  9.  7/23/2015: Switched to exemestane but discontinued after developing diffuse myalgias, nausea and flulike symptoms.    INTERIM  HISTORY:  Nelda Watts reports feeling overall well. She denies fevers, chills, night sweats, or breast complaints. She is residing at a residential home but plans to move to [place] this fall to be closer to her children and grandchildren.       REVIEW OF SYSTEMS:   14 point ROS was reviewed and is negative other than as noted above in HPI.       HOME MEDICATIONS:  Current Outpatient Medications   Medication Sig Dispense Refill     acetaminophen 650 MG TABS Take 650 mg by mouth every 6 hours 100 tablet      calcium-vitamin D (CALTRATE) 600-400 MG-UNIT per tablet Take 1 tablet by mouth 2 times daily        CLONAZEPAM PO Take 0.5 mg by mouth daily as needed for anxiety       COENZYME Q-10 PO Take 200 mg by mouth daily       Cyanocobalamin (B-12 PO) Take 1,000 mg by mouth every evening Patient does not know strength       cyanocobalamin 1000 MCG/ML injection Inject 1 mL into the muscle every 30 days Patient receives on the first week of each month       ELIQUIS ANTICOAGULANT 2.5 MG tablet        Fexofenadine HCl (ALLEGRA PO) Take 60 mg by mouth daily Breaks tab in 1/2 and takes 1/2 tab twice daily of single daily dose tab.       LACTOBACILLUS PO Take 1 capsule by mouth 2 times daily       MAGNESIUM OXIDE PO Take 500 mg by mouth At Bedtime        MELATONIN PO Take 3 mg by mouth At Bedtime        metoclopramide (REGLAN) 5 MG tablet Take 1 tablet (5 mg) by mouth 3 times daily as needed 20 tablet 0     multivitamin, therapeutic with minerals (MULTI-VITAMIN) TABS Take 1 tablet by mouth daily       OMEPRAZOLE PO Take 20 mg by mouth 2 times daily (before meals)       Ondansetron HCl (ZOFRAN PO) Take 4 mg by mouth every 6 hours as needed for nausea or vomiting       PROPRANOLOL HCL PO Take 40 mg by mouth 4 times daily        oxyCODONE (ROXICODONE) 5 MG immediate release tablet Take 1 tablet (5 mg) by mouth every 3 hours as needed for moderate to severe pain (Patient not taking: Reported on 6/17/2020) 100 tablet 0          ALLERGIES:  Allergies   Allergen Reactions     Anastrozole Nausea     Citalopram Nausea     Clonidine      Contrast Dye      Iodinated contrast     Hydralazine Nausea and Vomiting and GI Disturbance     Nifedipine Other (See Comments)     angioedema     No Clinical Screening - See Comments Other (See Comments)     Nitorimidazoles- paresthesias     Sulfa Drugs      Tetanus Immune Globulin      Vasotec          PAST MEDICAL HISTORY:  Past Medical History:   Diagnosis Date     Anxiety and depression      Arrhythmia     AF     Atrial fibrillation (H)      Blastocystis hominis infection      Cholelithiasis      Cysts     in liver     Diverticulitis      Ductal carcinoma (H)     infiltrating ductal carcinoma right breast     Essential tremor      Gastro-oesophageal reflux disease      Hypertension      Hypothyroidism (acquired)      IFG (impaired fasting glucose)      Malignant melanoma (H)      Myalgia and myositis, unspecified      Nausea     chronic     OA (osteoarthritis)      Scoliosis      Stroke (H) april 2011    has had two strokes, last in april 2011, in which she started on COUMADON     Suprasellar mass      Unspecified diastolic heart failure      Uterine fibroid      Vitamin B12 deficiency      Gynecologic history: Age of menarche at 13.  G6, P2 with age of first live birth at 20.  Hysterectomy in 1953.  Previously took HRT low-dose for several years but discontinued.    PAST SURGICAL HISTORY:  Past Surgical History:   Procedure Laterality Date     APPENDECTOMY       ARTHROPLASTY KNEE Right 9/28/2015    Procedure: ARTHROPLASTY KNEE;  Surgeon: Lucius Smith MD;  Location: [facility]     BIOPSY  1956,1997    breast     BIOPSY  1999    muscle     BREAST SURGERY      right lumpectomy 20 DEC 13     BREAST SURGERY Right     hematoma evacuation     GI SURGERY       GYN SURGERY Bilateral     salpingectomy     GYN SURGERY  1963    hysterectomy     ORTHOPEDIC SURGERY      bunionectomy     ORTHOPEDIC SURGERY       cyst removal from toe     VASCULAR SURGERY  ,    vein stripping         SOCIAL HISTORY:  Social History     Socioeconomic History     Marital status:      Spouse name: Not on file     Number of children: Not on file     Years of education: Not on file     Highest education level: Not on file   Occupational History     Not on file   Social Needs     Financial resource strain: Not on file     Food insecurity     Worry: Not on file     Inability: Not on file     Transportation needs     Medical: Not on file     Non-medical: Not on file   Tobacco Use     Smoking status: Never Smoker     Smokeless tobacco: Never Used   Substance and Sexual Activity     Alcohol use: Yes     Alcohol/week: 5.8 standard drinks     Types: 7 Glasses of wine per week     Drug use: No     Sexual activity: Not on file   Lifestyle     Physical activity     Days per week: Not on file     Minutes per session: Not on file     Stress: Not on file   Relationships     Social connections     Talks on phone: Not on file     Gets together: Not on file     Attends Judaism service: Not on file     Active member of club or organization: Not on file     Attends meetings of clubs or organizations: Not on file     Relationship status: Not on file     Intimate partner violence     Fear of current or ex partner: Not on file     Emotionally abused: Not on file     Physically abused: Not on file     Forced sexual activity: Not on file   Other Topics Concern     Not on file   Social History Narrative     Not on file     She is  and retired.  [facility] lives in a [facility].    FAMILY HISTORY:  Maternal uncle had breast cancer but never tested for any genetic mutation.  Grandmother had ovarian cancer at an older age.    PHYSICAL EXAM:  Vital signs:  BP (!) 145/84   Pulse 79   Temp 98.3  F (36.8  C) (Oral)   Resp 16   SpO2 97%    ECO  GENERAL/CONSTITUTIONAL: No acute distress.  EYES: No scleral icterus.  ENT/MOUTH: Neck supple. Oropharynx clear, no  mucositis.  LYMPH: No cervical, supraclavicular, axillary or epitrochlear adenopathy.   BREAST: No palpable discrete masses in either breast.  Stable concavity in the right lower breast from prior lumpectomy;  Nipples everted without discharge. No erythema or ulceration.  RESPIRATORY: No cough or labored breathing.   CARDIOVASCULAR: No PMI displacement.  GASTROINTESTINAL: No hepatosplenomegaly, masses, or tenderness. No guarding.  No distention.  MUSCULOSKELETAL: Warm and well-perfused, no cyanosis, clubbing, or edema.  NEUROLOGIC: No focal motor deficits. Alert, oriented, answers questions appropriately.  INTEGUMENTARY: No rashes or jaundice. Dry skin over legs bilaterally.  GAIT: Unsteady, required 1-person assistance to exam table.      LABS:  CBC RESULTS:   Recent Labs   Lab Test 09/10/17  0930   WBC 5.1   RBC 4.93   HGB 14.1   HCT 42.2   MCV 86   MCH 28.6   MCHC 33.4   RDW 14.1          Recent Labs   Lab Test 09/10/17  0930 11/02/15  1510    138   POTASSIUM 3.6 3.8   CHLORIDE 104 101   CO2 30 30   ANIONGAP 6 7   * 101*   BUN 14 11   CR 0.58 0.56   ELICEO 8.9 8.7         PATHOLOGY:  Reviewed as per HPI.    IMAGING:  3/04/2020: Mammogram reviewed and showed no suspicious findings.    ASSESSMENT/PLAN:  Nelda Watts is a 87 year old female with the following issues:  1.  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  -She is not on any hormone blockade therapy due to intolerance of multiple treatments.  -Continue annual mammograms, next due 3/2021.    2.  Essential hypertension  -Stable.  She only tolerates propanolol.    She is moving to [place] in the fall so I encouraged her to establish care with a primary care physician there after her  bright Cabezas MD  Hematology/Oncology  [facility]    I spent a total of 25 minutes with the patient, with greater than 50% of the time in counseling and coordination of care.

## 2020-11-05 ENCOUNTER — OFFICE VISIT (OUTPATIENT)
Dept: FAMILY MEDICINE | Facility: CLINIC | Age: 85
End: 2020-11-05
Payer: MEDICARE

## 2020-11-05 VITALS — DIASTOLIC BLOOD PRESSURE: 82 MMHG | SYSTOLIC BLOOD PRESSURE: 136 MMHG

## 2020-11-05 DIAGNOSIS — Z85.820 HISTORY OF MELANOMA: ICD-10-CM

## 2020-11-05 DIAGNOSIS — L82.0 INFLAMED SEBORRHEIC KERATOSIS: ICD-10-CM

## 2020-11-05 DIAGNOSIS — L81.4 LENTIGINES: ICD-10-CM

## 2020-11-05 DIAGNOSIS — D22.9 MULTIPLE BENIGN NEVI: ICD-10-CM

## 2020-11-05 DIAGNOSIS — D18.01 CHERRY ANGIOMA: Primary | ICD-10-CM

## 2020-11-05 DIAGNOSIS — Z85.828 HISTORY OF NONMELANOMA SKIN CANCER: ICD-10-CM

## 2020-11-05 DIAGNOSIS — L82.1 SEBORRHEIC KERATOSES: ICD-10-CM

## 2020-11-05 PROCEDURE — 17110 DESTRUCTION B9 LES UP TO 14: CPT | Performed by: PHYSICIAN ASSISTANT

## 2020-11-05 PROCEDURE — 99214 OFFICE O/P EST MOD 30 MIN: CPT | Mod: 25 | Performed by: PHYSICIAN ASSISTANT

## 2020-11-05 NOTE — LETTER
11/5/2020         RE: Nelda Watts  [address]  [place] [address]        Dear Colleague,    Thank you for referring your patient, Nelda Watts, to the [facility]. Please see a copy of my visit note below.    HPI:  Nelda Watts is a 87 year old female patient here today for FBE. Has some irritated spots on LE and neck .  Patient states this has been present for a while.  Patient reports the following symptoms: itch, bothersome .  Patient reports the following previous treatments: none.  Patient reports the following modifying factors: none.  Associated symptoms: none.  Patient has no other skin complaints today.  Remainder of the HPI, Meds, PMH, Allergies, FH, and SH was reviewed in chart.    Pertinent Hx:   LM left upper back 2017 and BCC thigh and ankle. Left medial breast Atypical intraepidermal melanocytic proliferation excised 1/6/2020.  Past Medical History:   Diagnosis Date     Anxiety and depression      Arrhythmia     AF     Atrial fibrillation (H)      Blastocystis hominis infection      Cholelithiasis      Cysts     in liver     Diverticulitis      Ductal carcinoma (H)     infiltrating ductal carcinoma right breast     Essential tremor      Gastro-oesophageal reflux disease      Hypertension      Hypothyroidism (acquired)      IFG (impaired fasting glucose)      Malignant melanoma (H)      Myalgia and myositis, unspecified      Nausea     chronic     OA (osteoarthritis)      Scoliosis      Stroke (H) april 2011    has had two strokes, last in april 2011, in which she started on COUMADON     Suprasellar mass      Unspecified diastolic heart failure      Uterine fibroid      Vitamin B12 deficiency        Past Surgical History:   Procedure Laterality Date     APPENDECTOMY       ARTHROPLASTY KNEE Right 9/28/2015    Procedure: ARTHROPLASTY KNEE;  Surgeon: Lucius Smith MD;  Location:  OR     BIOPSY  1956,1997    breast     BIOPSY  1999     muscle     BREAST SURGERY      right lumpectomy 20 DEC 13     BREAST SURGERY Right     hematoma evacuation     GI SURGERY       GYN SURGERY Bilateral     salpingectomy     GYN SURGERY  1963    hysterectomy     ORTHOPEDIC SURGERY      bunionectomy     ORTHOPEDIC SURGERY      cyst removal from toe     VASCULAR SURGERY  1980,2004    vein stripping        No family history on file.    Social History     Socioeconomic History     Marital status:      Spouse name: Not on file     Number of children: Not on file     Years of education: Not on file     Highest education level: Not on file   Occupational History     Not on file   Social Needs     Financial resource strain: Not on file     Food insecurity     Worry: Not on file     Inability: Not on file     Transportation needs     Medical: Not on file     Non-medical: Not on file   Tobacco Use     Smoking status: Never Smoker     Smokeless tobacco: Never Used   Substance and Sexual Activity     Alcohol use: Yes     Alcohol/week: 5.8 standard drinks     Types: 7 Glasses of wine per week     Drug use: No     Sexual activity: Not on file   Lifestyle     Physical activity     Days per week: Not on file     Minutes per session: Not on file     Stress: Not on file   Relationships     Social connections     Talks on phone: Not on file     Gets together: Not on file     Attends [employer] service: Not on file     Active member of club or organization: Not on file     Attends meetings of clubs or organizations: Not on file     Relationship status: Not on file     Intimate partner violence     Fear of current or ex partner: Not on file     Emotionally abused: Not on file     Physically abused: Not on file     Forced sexual activity: Not on file   Other Topics Concern     Not on file   Social History Narrative     Not on file       Outpatient Encounter Medications as of 11/5/2020   Medication Sig Dispense Refill     acetaminophen 650 MG TABS Take 650 mg by mouth every 6 hours  100 tablet      calcium-vitamin D (CALTRATE) 600-400 MG-UNIT per tablet Take 1 tablet by mouth 2 times daily        CLONAZEPAM PO Take 0.5 mg by mouth daily as needed for anxiety       COENZYME Q-10 PO Take 200 mg by mouth daily       Cyanocobalamin (B-12 PO) Take 1,000 mg by mouth every evening Patient does not know strength       cyanocobalamin 1000 MCG/ML injection Inject 1 mL into the muscle every 30 days Patient receives on the first week of each month       ELIQUIS ANTICOAGULANT 2.5 MG tablet        Fexofenadine HCl (ALLEGRA PO) Take 60 mg by mouth daily Breaks tab in 1/2 and takes 1/2 tab twice daily of single daily dose tab.       LACTOBACILLUS PO Take 1 capsule by mouth 2 times daily       MAGNESIUM OXIDE PO Take 500 mg by mouth At Bedtime        MELATONIN PO Take 3 mg by mouth At Bedtime        metoclopramide (REGLAN) 5 MG tablet Take 1 tablet (5 mg) by mouth 3 times daily as needed 20 tablet 0     multivitamin, therapeutic with minerals (MULTI-VITAMIN) TABS Take 1 tablet by mouth daily       OMEPRAZOLE PO Take 20 mg by mouth 2 times daily (before meals)       Ondansetron HCl (ZOFRAN PO) Take 4 mg by mouth every 6 hours as needed for nausea or vomiting       oxyCODONE (ROXICODONE) 5 MG immediate release tablet Take 1 tablet (5 mg) by mouth every 3 hours as needed for moderate to severe pain (Patient not taking: Reported on 6/17/2020) 100 tablet 0     PROPRANOLOL HCL PO Take 40 mg by mouth 4 times daily        No facility-administered encounter medications on file as of 11/5/2020.        Review Of Systems:  Skin: spots  Eyes: negative  Ears/Nose/Throat: negative  Respiratory: No shortness of breath, dyspnea on exertion, cough, or hemoptysis  Cardiovascular: negative  Gastrointestinal: negative  Genitourinary: negative  Musculoskeletal: negative  Neurologic: negative  Psychiatric: negative  Hematologic/Lymphatic/Immunologic: negative  Endocrine: negative      Objective:     There were no vitals taken for  this visit.  Eyes: Conjunctivae/lids: Normal   ENT: Lips:  Normal  MSK: Normal  Cardiovascular: Peripheral edema none  Pulm: Breathing Normal  Neuro/Psych: Orientation: A/O x 3. Normal; Mood/Affect: Normal, NAD, WDWN  Pt accompanied by: self  Following areas examined: Scalp, face, eyelids, lips, neck, chest, abdomen, back, and R&L upper and lower extremities. Pt defers exam of buttock, hips, groin and genitals.   Elkins skin type:i   Nodes: inguinal, axillary NLAD  Findings:  Red smooth well-defined macules on trunk and extremities.  Brown, stuck-on scaly appearing papules on trunk and extremities.  Well circumscribed macules with symmetric color distribution on trunk and extremities.  Tan WD smooth macules on face, neck, trunk, and extremities.  Inflamed brown, stuck-on scaly appearing papules on right lateral neck, left thigh and right leg    Assessment and Plan:     1) Cherry angiomas, Seborrheic keratoses, Benign nevi, Lentigines     I discussed the specifics of tumor, prognosis, and genetics of benign lesions.  I explained that treatment of these lesions would be purely cosmetic and not medically neccessary.  I discussed with patient different removal options including excision, cryotherapy, cautery and /or laser.  Lesion may recur and/or may not completely resolve. May need additional treatment.     2) ISK x 6  Benign etiology and course of lesion.  LN2: Treated with LN2 for 5s for 1-2 cycles. Warned risks of blistering, pain, pigment change, scarring, and incomplete resolution.  Advised patient to return if lesions do not completely resolve within 2-3 months.  Wound care sheet given.    3) history of LM, NMSC and Atypical intraepidermal melanocytic proliferation  LM left upper back 2017 and BCC thigh and ankle. Left medial breast Atypical intraepidermal melanocytic proliferation excised 1/6/2020.  No evidence of recurrence  Signs and Symptoms of non-melanoma skin cancer and ABCDEs of melanoma reviewed  with patient. Patient encouraged to perform monthly self skin exams and educated on how to perform them. UV precautions reviewed with patient. Patient was asked about new or changing moles/lesions on body.   Wear a sunscreen with at least SPF 30 on your face, ears, neck and V of the chest daily. Wear sunscreen on other areas of the body if those areas are exposed to the sun throughout the day. Sunscreens can contain physical and/or chemical blockers. Physical blockers are less likely to clog pores, these include zinc oxide and titanium dioxide. Reapply every two hour and after swimming. Sunscreen examples include Neutrogena, CeraVe, Blue Lizard, Elta MD and many others.    Proper skin care from [facility]:    -Eliminate harsh soaps as they strip the natural oils from the skin, often resulting in dry itchy skin ( i.e. Dial, Zest, [place] Spring)  -Use mild soaps such as Cetaphil or Dove Sensitive Skin in the shower. You do not need to use soap on arms, legs, and trunk every time you shower unless visibly soiled.   -Avoid hot or cold showers.  -After showering, lightly dry off and apply moisturizing within 2-3 minutes. This will help trap moisture in the skin.   -Aggressive use of a moisturizer at least 1-2 times a day to the entire body (including -Vanicream, Cetaphil, Aquaphor or Cerave) and moisturize hands after every washing.  -We recommend using moisturizers that come in a tub that needs to be scooped out, not a pump. This has more of an oil base. It will hold moisture in your skin much better than a water base moisturizer. The above recommended are non-pore clogging.               Follow up in 6 month fbe        Again, thank you for allowing me to participate in the care of your patient.        Sincerely,        Dee Diana PA-C

## 2020-11-05 NOTE — PATIENT INSTRUCTIONS
Proper skin care from [facility]:    -Eliminate harsh soaps as they strip the natural oils from the skin, often resulting in dry itchy skin ( i.e. Dial, Zest, [place] Spring)  -Use mild soaps such as Cetaphil or Dove Sensitive Skin in the shower. You do not need to use soap on arms, legs, and trunk every time you shower unless visibly soiled.   -Avoid hot or cold showers.  -After showering, lightly dry off and apply moisturizing within 2-3 minutes. This will help trap moisture in the skin.   -Aggressive use of a moisturizer at least 1-2 times a day to the entire body (including -Vanicream, Cetaphil, Aquaphor or Cerave) and moisturize hands after every washing.  -We recommend using moisturizers that come in a tub that needs to be scooped out, not a pump. This has more of an oil base. It will hold moisture in your skin much better than a water base moisturizer. The above recommended are non-pore clogging.      Wear a sunscreen with at least SPF 30 on your face, ears, neck and V of the chest daily. Wear sunscreen on other areas of the body if those areas are exposed to the sun throughout the day. Sunscreens can contain physical and/or chemical blockers. Physical blockers are less likely to clog pores, these include zinc oxide and titanium dioxide. Reapply every two hour and after swimming. Sunscreen examples include Neutrogena, CeraVe, Blue Lizard, Elta MD and many others.    UV radiation  UVA radiation remains constant throughout the day and throughout the year. It is a longer wavelength than UVB and therefore penetrates deeper into the skin leading to immediate and delayed tanning, photoaging, and skin cancer. 70-80% of UVA and UVB radiation occurs between the hours of 10am-2pm.  UVB radiation  UVB radiation causes the most harmful effects and is more significant during the summer months. However, snow and ice can reflect UVB radiation leading to skin damage during the winter months as well. UVB radiation is  responsible for tanning, burning, inflammation, delayed erythema (pinkness), pigmentation (brown spots), and skin cancer.     I recommend self monthly full body exams and yearly full body exams with a dermatology provider. If you develop a new or changing lesion please follow up for examination. Most skin cancers are pink and scaly or pink and pearly. However, we do see blue/brown/black skin cancers.  Consider the ABCDEs of melanoma when giving yourself your monthly full body exam ( don't forget the groin, buttocks, feet, toes, etc). A-asymmetry, B-borders, C-color, D-diameter, E-elevation or evolving. If you see any of these changes please follow up in clinic. If you cannot see your back I recommend purchasing a hand held mirror to use with a larger wall mirror.          WOUND CARE INSTRUCTIONS  FOR CRYOSURGERY        This area treated with liquid nitrogen will form a blister. You do not need to bandage the area until after the blister forms and breaks (which may be a few days).  When the blister breaks, begin daily dressing changes as follows:    1) Clean and dry the area with tap water using clean Q-tip or sterile gauze pad.    2) Apply Polysporin ointment or Bacitracin ointment over entire wound.  Do NOT use Neosporin ointment.    3) Cover the wound with a band-aid or sterile non-stick gauze pad and micropore paper tape.      REPEAT THESE INSTRUCTIONS AT LEAST ONCE A DAY UNTIL THE WOUND HAS COMPLETELY HEALED.        It is an old wives tale that a wound heals better when it is exposed to air and allowed to dry out. The wound will heal faster with a better cosmetic result if it is kept moist with ointment and covered with a bandage.  Do not let the wound dry out.      Supplies Needed:     *Cotton tipped applicators (Q-tips)   *Polysporin ointment or Bacitracin ointment (NOT NEOSPORIN)   *Band-aids, or non stick gauze pads and micropore paper tape    PATIENT INFORMATION    During the healing process you will notice  a number of changes. All wounds develop a small halo of redness surrounding the wound.  This means healing is occurring. Severe itching with extensive redness usually indicates sensitivity to the ointment or bandage tape used to dress the wound.  You should call our office if this develops.      Swelling and/or discoloration around your surgical site is common, particularly when performed around the eye.    All wounds normally drain.  The larger the wound the more drainage there will be.  After 7-10 days, you will notice the wound beginning to shrink and new skin will begin to grow.  The wound is healed when you can see skin has formed over the entire area.  A healed wound has a healthy, shiny look to the surface and is red to dark pink in color to normalize.  Wounds may take approximately 4-6 weeks to heal.  Larger wounds may take 6-8 weeks.  After the wound is healed you may discontinue dressing changes.    You may experience a sensation of tightness as your wound heals. This is normal and will gradually subside.    Your healed wound may be sensitive to temperature changes. This sensitivity improves with time, but if you re having a lot of discomfort, try to avoid temperature extremes.    Patients frequently experience itching after their wound appears to have healed because of the continue healing under the skin.  Plain Vaseline will help relieve the itching.

## 2020-11-05 NOTE — PROGRESS NOTES
HPI:  Nelda Watts is a 87 year old female patient here today for FBE. Has some irritated spots on LE and neck .  Patient states this has been present for a while.  Patient reports the following symptoms: itch, bothersome .  Patient reports the following previous treatments: none.  Patient reports the following modifying factors: none.  Associated symptoms: none.  Patient has no other skin complaints today.  Remainder of the HPI, Meds, PMH, Allergies, FH, and SH was reviewed in chart.    Pertinent Hx:   LM left upper back 2017 and BCC thigh and ankle. Left medial breast Atypical intraepidermal melanocytic proliferation excised 1/6/2020.  Past Medical History:   Diagnosis Date     Anxiety and depression      Arrhythmia     AF     Atrial fibrillation (H)      Blastocystis hominis infection      Cholelithiasis      Cysts     in liver     Diverticulitis      Ductal carcinoma (H)     infiltrating ductal carcinoma right breast     Essential tremor      Gastro-oesophageal reflux disease      Hypertension      Hypothyroidism (acquired)      IFG (impaired fasting glucose)      Malignant melanoma (H)      Myalgia and myositis, unspecified      Nausea     chronic     OA (osteoarthritis)      Scoliosis      Stroke (H) april 2011    has had two strokes, last in april 2011, in which she started on COUMADON     Suprasellar mass      Unspecified diastolic heart failure      Uterine fibroid      Vitamin B12 deficiency        Past Surgical History:   Procedure Laterality Date     APPENDECTOMY       ARTHROPLASTY KNEE Right 9/28/2015    Procedure: ARTHROPLASTY KNEE;  Surgeon: Lucius Smith MD;  Location: [facility]     BIOPSY  1956,1997    breast     BIOPSY  1999    muscle     BREAST SURGERY      right lumpectomy 20 DEC 13     BREAST SURGERY Right     hematoma evacuation     GI SURGERY       GYN SURGERY Bilateral     salpingectomy     GYN SURGERY  1963    hysterectomy     ORTHOPEDIC SURGERY      bunionectomy     ORTHOPEDIC SURGERY       cyst removal from toe     VASCULAR SURGERY  1980,2004    vein stripping        No family history on file.    Social History     Socioeconomic History     Marital status:      Spouse name: Not on file     Number of children: Not on file     Years of education: Not on file     Highest education level: Not on file   Occupational History     Not on file   Social Needs     Financial resource strain: Not on file     Food insecurity     Worry: Not on file     Inability: Not on file     Transportation needs     Medical: Not on file     Non-medical: Not on file   Tobacco Use     Smoking status: Never Smoker     Smokeless tobacco: Never Used   Substance and Sexual Activity     Alcohol use: Yes     Alcohol/week: 5.8 standard drinks     Types: 7 Glasses of wine per week     Drug use: No     Sexual activity: Not on file   Lifestyle     Physical activity     Days per week: Not on file     Minutes per session: Not on file     Stress: Not on file   Relationships     Social connections     Talks on phone: Not on file     Gets together: Not on file     Attends Hindu service: Not on file     Active member of club or organization: Not on file     Attends meetings of clubs or organizations: Not on file     Relationship status: Not on file     Intimate partner violence     Fear of current or ex partner: Not on file     Emotionally abused: Not on file     Physically abused: Not on file     Forced sexual activity: Not on file   Other Topics Concern     Not on file   Social History Narrative     Not on file       Outpatient Encounter Medications as of 11/5/2020   Medication Sig Dispense Refill     acetaminophen 650 MG TABS Take 650 mg by mouth every 6 hours 100 tablet      calcium-vitamin D (CALTRATE) 600-400 MG-UNIT per tablet Take 1 tablet by mouth 2 times daily        CLONAZEPAM PO Take 0.5 mg by mouth daily as needed for anxiety       COENZYME Q-10 PO Take 200 mg by mouth daily       Cyanocobalamin (B-12 PO) Take 1,000 mg  by mouth every evening Patient does not know strength       cyanocobalamin 1000 MCG/ML injection Inject 1 mL into the muscle every 30 days Patient receives on the first week of each month       ELIQUIS ANTICOAGULANT 2.5 MG tablet        Fexofenadine HCl (ALLEGRA PO) Take 60 mg by mouth daily Breaks tab in 1/2 and takes 1/2 tab twice daily of single daily dose tab.       LACTOBACILLUS PO Take 1 capsule by mouth 2 times daily       MAGNESIUM OXIDE PO Take 500 mg by mouth At Bedtime        MELATONIN PO Take 3 mg by mouth At Bedtime        metoclopramide (REGLAN) 5 MG tablet Take 1 tablet (5 mg) by mouth 3 times daily as needed 20 tablet 0     multivitamin, therapeutic with minerals (MULTI-VITAMIN) TABS Take 1 tablet by mouth daily       OMEPRAZOLE PO Take 20 mg by mouth 2 times daily (before meals)       Ondansetron HCl (ZOFRAN PO) Take 4 mg by mouth every 6 hours as needed for nausea or vomiting       oxyCODONE (ROXICODONE) 5 MG immediate release tablet Take 1 tablet (5 mg) by mouth every 3 hours as needed for moderate to severe pain (Patient not taking: Reported on 6/17/2020) 100 tablet 0     PROPRANOLOL HCL PO Take 40 mg by mouth 4 times daily        No facility-administered encounter medications on file as of 11/5/2020.        Review Of Systems:  Skin: spots  Eyes: negative  Ears/Nose/Throat: negative  Respiratory: No shortness of breath, dyspnea on exertion, cough, or hemoptysis  Cardiovascular: negative  Gastrointestinal: negative  Genitourinary: negative  Musculoskeletal: negative  Neurologic: negative  Psychiatric: negative  Hematologic/Lymphatic/Immunologic: negative  Endocrine: negative      Objective:     There were no vitals taken for this visit.  Eyes: Conjunctivae/lids: Normal   ENT: Lips:  Normal  MSK: Normal  Cardiovascular: Peripheral edema none  Pulm: Breathing Normal  Neuro/Psych: Orientation: A/O x 3. Normal; Mood/Affect: Normal, NAD, WDWN  Pt accompanied by: self  Following areas examined: Scalp,  face, eyelids, lips, neck, chest, abdomen, back, and R&L upper and lower extremities. Pt defers exam of buttock, hips, groin and genitals.   Elkins skin type:i   Nodes: inguinal, axillary NLAD  Findings:  Red smooth well-defined macules on trunk and extremities.  Brown, stuck-on scaly appearing papules on trunk and extremities.  Well circumscribed macules with symmetric color distribution on trunk and extremities.  Tan WD smooth macules on face, neck, trunk, and extremities.  Inflamed brown, stuck-on scaly appearing papules on right lateral neck, left thigh and right leg    Assessment and Plan:     1) Cherry angiomas, Seborrheic keratoses, Benign nevi, Lentigines     I discussed the specifics of tumor, prognosis, and genetics of benign lesions.  I explained that treatment of these lesions would be purely cosmetic and not medically neccessary.  I discussed with patient different removal options including excision, cryotherapy, cautery and /or laser.  Lesion may recur and/or may not completely resolve. May need additional treatment.     2) ISK x 6  Benign etiology and course of lesion.  LN2: Treated with LN2 for 5s for 1-2 cycles. Warned risks of blistering, pain, pigment change, scarring, and incomplete resolution.  Advised patient to return if lesions do not completely resolve within 2-3 months.  Wound care sheet given.    3) history of LM, NMSC and Atypical intraepidermal melanocytic proliferation  LM left upper back 2017 and BCC thigh and ankle. Left medial breast Atypical intraepidermal melanocytic proliferation excised 1/6/2020.  No evidence of recurrence  Signs and Symptoms of non-melanoma skin cancer and ABCDEs of melanoma reviewed with patient. Patient encouraged to perform monthly self skin exams and educated on how to perform them. UV precautions reviewed with patient. Patient was asked about new or changing moles/lesions on body.   Wear a sunscreen with at least SPF 30 on your face, ears, neck and V  of the chest daily. Wear sunscreen on other areas of the body if those areas are exposed to the sun throughout the day. Sunscreens can contain physical and/or chemical blockers. Physical blockers are less likely to clog pores, these include zinc oxide and titanium dioxide. Reapply every two hour and after swimming. Sunscreen examples include Neutrogena, CeraVe, Blue Lizard, Elta MD and many others.    Proper skin care from [facility]:    -Eliminate harsh soaps as they strip the natural oils from the skin, often resulting in dry itchy skin ( i.e. Dial, Zest, [place])  -Use mild soaps such as Cetaphil or Dove Sensitive Skin in the shower. You do not need to use soap on arms, legs, and trunk every time you shower unless visibly soiled.   -Avoid hot or cold showers.  -After showering, lightly dry off and apply moisturizing within 2-3 minutes. This will help trap moisture in the skin.   -Aggressive use of a moisturizer at least 1-2 times a day to the entire body (including -Vanicream, Cetaphil, Aquaphor or Cerave) and moisturize hands after every washing.  -We recommend using moisturizers that come in a tub that needs to be scooped out, not a pump. This has more of an oil base. It will hold moisture in your skin much better than a water base moisturizer. The above recommended are non-pore clogging.               Follow up in 6 month fbe

## 2021-04-12 ENCOUNTER — HOSPITAL ENCOUNTER (OUTPATIENT)
Dept: MAMMOGRAPHY | Facility: CLINIC | Age: 86
Discharge: HOME | End: 2021-04-12
Attending: INTERNAL MEDICINE | Admitting: INTERNAL MEDICINE
Payer: COMMERCIAL

## 2021-04-12 DIAGNOSIS — Z12.31 VISIT FOR SCREENING MAMMOGRAM: ICD-10-CM

## 2021-04-12 PROCEDURE — 77063 BREAST TOMOSYNTHESIS BI: CPT

## 2021-06-04 ENCOUNTER — OFFICE VISIT (OUTPATIENT)
Dept: FAMILY MEDICINE | Facility: CLINIC | Age: 86
End: 2021-06-04
Payer: COMMERCIAL

## 2021-06-04 VITALS — SYSTOLIC BLOOD PRESSURE: 138 MMHG | DIASTOLIC BLOOD PRESSURE: 92 MMHG

## 2021-06-04 DIAGNOSIS — L82.1 SEBORRHEIC KERATOSES: ICD-10-CM

## 2021-06-04 DIAGNOSIS — D22.9 MULTIPLE BENIGN NEVI: Primary | ICD-10-CM

## 2021-06-04 DIAGNOSIS — Z85.828 HISTORY OF NONMELANOMA SKIN CANCER: ICD-10-CM

## 2021-06-04 DIAGNOSIS — L81.4 LENTIGINES: ICD-10-CM

## 2021-06-04 DIAGNOSIS — D18.01 CHERRY ANGIOMA: ICD-10-CM

## 2021-06-04 DIAGNOSIS — Z85.820 HISTORY OF MELANOMA: ICD-10-CM

## 2021-06-04 PROCEDURE — 99214 OFFICE O/P EST MOD 30 MIN: CPT | Performed by: PHYSICIAN ASSISTANT

## 2021-06-04 NOTE — PROGRESS NOTES
HPI:  Nelda Watts is a 87 year old female patient here today for FBE. Has some spots on back  Patient states this has been present for a while.  Patient reports the following symptoms: none.  Patient reports the following previous treatments: none.  Patient reports the following modifying factors: none.  Associated symptoms: none.  Patient has no other skin complaints today.  Remainder of the HPI, Meds, PMH, Allergies, FH, and SH was reviewed in chart.    Pertinent Hx:   LM left upper back 2017 and BCC thigh and ankle. Left medial breast Atypical intraepidermal melanocytic proliferation excised 1/6/2020.  Past Medical History:   Diagnosis Date     Anxiety and depression      Arrhythmia     AF     Atrial fibrillation (H)      Blastocystis hominis infection      Cholelithiasis      Cysts     in liver     Diverticulitis      Ductal carcinoma (H)     infiltrating ductal carcinoma right breast     Essential tremor      Gastro-oesophageal reflux disease      Hypertension      Hypothyroidism (acquired)      IFG (impaired fasting glucose)      Malignant melanoma (H)      Myalgia and myositis, unspecified      Nausea     chronic     OA (osteoarthritis)      Scoliosis      Stroke (H) april 2011    has had two strokes, last in april 2011, in which she started on COUMADON     Suprasellar mass      Unspecified diastolic heart failure      Uterine fibroid      Vitamin B12 deficiency        Past Surgical History:   Procedure Laterality Date     APPENDECTOMY       ARTHROPLASTY KNEE Right 9/28/2015    Procedure: ARTHROPLASTY KNEE;  Surgeon: Lucius Smith MD;  Location: [facility] OR     BIOPSY  1956,1997    breast     BIOPSY  1999    muscle     BREAST SURGERY      right lumpectomy 20 DEC 13     BREAST SURGERY Right     hematoma evacuation     GI SURGERY       GYN SURGERY Bilateral     salpingectomy     GYN SURGERY  1963    hysterectomy     ORTHOPEDIC SURGERY      bunionectomy     ORTHOPEDIC SURGERY      cyst removal from toe      VASCULAR SURGERY  1980,2004    vein stripping        No family history on file.    Social History     Socioeconomic History     Marital status:      Spouse name: Not on file     Number of children: Not on file     Years of education: Not on file     Highest education level: Not on file   Occupational History     Not on file   Social Needs     Financial resource strain: Not on file     Food insecurity     Worry: Not on file     Inability: Not on file     Transportation needs     Medical: Not on file     Non-medical: Not on file   Tobacco Use     Smoking status: Never Smoker     Smokeless tobacco: Never Used   Substance and Sexual Activity     Alcohol use: Yes     Alcohol/week: 5.8 standard drinks     Types: 7 Glasses of wine per week     Drug use: No     Sexual activity: Not on file   Lifestyle     Physical activity     Days per week: Not on file     Minutes per session: Not on file     Stress: Not on file   Relationships     Social connections     Talks on phone: Not on file     Gets together: Not on file     Attends Gnosticist service: Not on file     Active member of club or organization: Not on file     Attends meetings of clubs or organizations: Not on file     Relationship status: Not on file     Intimate partner violence     Fear of current or ex partner: Not on file     Emotionally abused: Not on file     Physically abused: Not on file     Forced sexual activity: Not on file   Other Topics Concern     Not on file   Social History Narrative     Not on file       Outpatient Encounter Medications as of 6/4/2021   Medication Sig Dispense Refill     calcium-vitamin D (CALTRATE) 600-400 MG-UNIT per tablet Take 1 tablet by mouth 2 times daily        COENZYME Q-10 PO Take 200 mg by mouth daily       Cyanocobalamin (B-12 PO) Take 1,000 mg by mouth every evening Patient does not know strength       cyanocobalamin 1000 MCG/ML injection Inject 1 mL into the muscle every 30 days Patient receives on the first week of  each month       ELIQUIS ANTICOAGULANT 2.5 MG tablet        LACTOBACILLUS PO Take 1 capsule by mouth 2 times daily       MAGNESIUM OXIDE PO Take 500 mg by mouth At Bedtime        MELATONIN PO Take 3 mg by mouth At Bedtime        multivitamin, therapeutic with minerals (MULTI-VITAMIN) TABS Take 1 tablet by mouth daily       OMEPRAZOLE PO Take 20 mg by mouth 2 times daily (before meals)       PROPRANOLOL HCL PO Take 40 mg by mouth 4 times daily        acetaminophen 650 MG TABS Take 650 mg by mouth every 6 hours (Patient not taking: Reported on 6/4/2021) 100 tablet      CLONAZEPAM PO Take 0.5 mg by mouth daily as needed for anxiety       Ondansetron HCl (ZOFRAN PO) Take 4 mg by mouth every 6 hours as needed for nausea or vomiting       [DISCONTINUED] Fexofenadine HCl (ALLEGRA PO) Take 60 mg by mouth daily Breaks tab in 1/2 and takes 1/2 tab twice daily of single daily dose tab.       [DISCONTINUED] metoclopramide (REGLAN) 5 MG tablet Take 1 tablet (5 mg) by mouth 3 times daily as needed 20 tablet 0     [DISCONTINUED] oxyCODONE (ROXICODONE) 5 MG immediate release tablet Take 1 tablet (5 mg) by mouth every 3 hours as needed for moderate to severe pain (Patient not taking: Reported on 6/17/2020) 100 tablet 0     No facility-administered encounter medications on file as of 6/4/2021.        Review Of Systems:  Skin: spots  Eyes: negative  Ears/Nose/Throat: negative  Respiratory: No shortness of breath, dyspnea on exertion, cough, or hemoptysis  Cardiovascular: negative  Gastrointestinal: negative  Genitourinary: negative  Musculoskeletal: negative  Neurologic: negative  Psychiatric: negative  Hematologic/Lymphatic/Immunologic: negative  Endocrine: negative      Objective:     BP (!) 138/92   Eyes: Conjunctivae/lids: Normal   ENT: Lips:  Normal  MSK: Normal  Cardiovascular: Peripheral edema none  Pulm: Breathing Normal  Neuro/Psych: Orientation: A/O x 3. Normal; Mood/Affect: Normal, NAD, WDWN  Pt accompanied by:  self  Following areas examined: Scalp, face, eyelids, lips, neck, chest, abdomen, back, and R&L upper and lower extremities. Pt defers exam of buttock, hips, groin and genitals.   Elkins skin type:i   Nodes:Submental, maxillary, cervical, supraclavicular, post/pre auricular, axillary, antecubital, inguinal, and popliteal NLAD.     Findings:  Red smooth well-defined macules on trunk and extremities.  Brown, stuck-on scaly appearing papules on back, trunk and extremities.  Well circumscribed macules with symmetric color distribution on trunk and extremities.  Tan WD smooth macules on face, neck, trunk, and extremities.    Assessment and Plan:     1) Cherry angiomas, Seborrheic keratoses, Benign nevi, Lentigines     I discussed the specifics of tumor, prognosis, and genetics of benign lesions.  I explained that treatment of these lesions would be purely cosmetic and not medically neccessary.  I discussed with patient different removal options including excision, cryotherapy, cautery and /or laser.  Lesion may recur and/or may not completely resolve. May need additional treatment.     2) history of LM, NMSC and Atypical intraepidermal melanocytic proliferation  LM left upper back 2017 and BCC thigh and ankle.   Left medial breast Atypical intraepidermal melanocytic proliferation excised 1/6/2020.  No evidence of recurrence  Signs and Symptoms of non-melanoma skin cancer and ABCDEs of melanoma reviewed with patient. Patient encouraged to perform monthly self skin exams and educated on how to perform them. UV precautions reviewed with patient. Patient was asked about new or changing moles/lesions on body.   Wear a sunscreen with at least SPF 30 on your face, ears, neck and V of the chest daily. Wear sunscreen on other areas of the body if those areas are exposed to the sun throughout the day. Sunscreens can contain physical and/or chemical blockers. Physical blockers are less likely to clog pores, these include zinc  oxide and titanium dioxide. Reapply every two hour and after swimming. Sunscreen examples include Neutrogena, CeraVe, Blue Lizard, Elta MD and many others.    Proper skin care from [facility]:    -Eliminate harsh soaps as they strip the natural oils from the skin, often resulting in dry itchy skin ( i.e. Dial, Zest, [place])  -Use mild soaps such as Cetaphil or Dove Sensitive Skin in the shower. You do not need to use soap on arms, legs, and trunk every time you shower unless visibly soiled.   -Avoid hot or cold showers.  -After showering, lightly dry off and apply moisturizing within 2-3 minutes. This will help trap moisture in the skin.   -Aggressive use of a moisturizer at least 1-2 times a day to the entire body (including -Vanicream, Cetaphil, Aquaphor or Cerave) and moisturize hands after every washing.  -We recommend using moisturizers that come in a tub that needs to be scooped out, not a pump. This has more of an oil base. It will hold moisture in your skin much better than a water base moisturizer. The above recommended are non-pore clogging.               Follow up in 6 month fbe

## 2021-06-04 NOTE — PATIENT INSTRUCTIONS
Proper skin care from [facility]:    -Eliminate harsh soaps as they strip the natural oils from the skin, often resulting in dry itchy skin ( i.e. Dial, Zest, [place] Spring)  -Use mild soaps such as Cetaphil or Dove Sensitive Skin in the shower. You do not need to use soap on arms, legs, and trunk every time you shower unless visibly soiled.   -Avoid hot or cold showers.  -After showering, lightly dry off and apply moisturizing within 2-3 minutes. This will help trap moisture in the skin.   -Aggressive use of a moisturizer at least 1-2 times a day to the entire body (including -Vanicream, Cetaphil, Aquaphor or Cerave) and moisturize hands after every washing.  -We recommend using moisturizers that come in a tub that needs to be scooped out, not a pump. This has more of an oil base. It will hold moisture in your skin much better than a water base moisturizer. The above recommended are non-pore clogging.      Wear a sunscreen with at least SPF 30 on your face, ears, neck and V of the chest daily. Wear sunscreen on other areas of the body if those areas are exposed to the sun throughout the day. Sunscreens can contain physical and/or chemical blockers. Physical blockers are less likely to clog pores, these include zinc oxide and titanium dioxide. Reapply every two hour and after swimming. Sunscreen examples include Neutrogena, CeraVe, Blue Lizard, Elta MD and many others.    UV radiation  UVA radiation remains constant throughout the day and throughout the year. It is a longer wavelength than UVB and therefore penetrates deeper into the skin leading to immediate and delayed tanning, photoaging, and skin cancer. 70-80% of UVA and UVB radiation occurs between the hours of 10am-2pm.  UVB radiation  UVB radiation causes the most harmful effects and is more significant during the summer months. However, snow and ice can reflect UVB radiation leading to skin damage during the winter months as well. UVB radiation is  responsible for tanning, burning, inflammation, delayed erythema (pinkness), pigmentation (brown spots), and skin cancer.     I recommend self monthly full body exams and yearly full body exams with a dermatology provider. If you develop a new or changing lesion please follow up for examination. Most skin cancers are pink and scaly or pink and pearly. However, we do see blue/brown/black skin cancers.  Consider the ABCDEs of melanoma when giving yourself your monthly full body exam ( don't forget the groin, buttocks, feet, toes, etc). A-asymmetry, B-borders, C-color, D-diameter, E-elevation or evolving. If you see any of these changes please follow up in clinic. If you cannot see your back I recommend purchasing a hand held mirror to use with a larger wall mirror.

## 2021-06-04 NOTE — LETTER
6/4/2021         RE: Nelda Watts  [address]  [place] [address]        Dear Colleague,    Thank you for referring your patient, Nelda Watts, to the [facility]. Please see a copy of my visit note below.    HPI:  Nelda Watts is a 87 year old female patient here today for FBE. Has some spots on back  Patient states this has been present for a while.  Patient reports the following symptoms: none.  Patient reports the following previous treatments: none.  Patient reports the following modifying factors: none.  Associated symptoms: none.  Patient has no other skin complaints today.  Remainder of the HPI, Meds, PMH, Allergies, FH, and SH was reviewed in chart.    Pertinent Hx:   LM left upper back 2017 and BCC thigh and ankle. Left medial breast Atypical intraepidermal melanocytic proliferation excised 1/6/2020.  Past Medical History:   Diagnosis Date     Anxiety and depression      Arrhythmia     AF     Atrial fibrillation (H)      Blastocystis hominis infection      Cholelithiasis      Cysts     in liver     Diverticulitis      Ductal carcinoma (H)     infiltrating ductal carcinoma right breast     Essential tremor      Gastro-oesophageal reflux disease      Hypertension      Hypothyroidism (acquired)      IFG (impaired fasting glucose)      Malignant melanoma (H)      Myalgia and myositis, unspecified      Nausea     chronic     OA (osteoarthritis)      Scoliosis      Stroke (H) april 2011    has had two strokes, last in april 2011, in which she started on COUMADON     Suprasellar mass      Unspecified diastolic heart failure      Uterine fibroid      Vitamin B12 deficiency        Past Surgical History:   Procedure Laterality Date     APPENDECTOMY       ARTHROPLASTY KNEE Right 9/28/2015    Procedure: ARTHROPLASTY KNEE;  Surgeon: Lucius Smith MD;  Location: [facility] OR     BIOPSY  1956,1997    breast     BIOPSY  1999    muscle     BREAST SURGERY       right lumpectomy 20 DEC 13     BREAST SURGERY Right     hematoma evacuation     GI SURGERY       GYN SURGERY Bilateral     salpingectomy     GYN SURGERY  1963    hysterectomy     ORTHOPEDIC SURGERY      bunionectomy     ORTHOPEDIC SURGERY      cyst removal from toe     VASCULAR SURGERY  1980,2004    vein stripping        No family history on file.    Social History     Socioeconomic History     Marital status:      Spouse name: Not on file     Number of children: Not on file     Years of education: Not on file     Highest education level: Not on file   Occupational History     Not on file   Social Needs     Financial resource strain: Not on file     Food insecurity     Worry: Not on file     Inability: Not on file     Transportation needs     Medical: Not on file     Non-medical: Not on file   Tobacco Use     Smoking status: Never Smoker     Smokeless tobacco: Never Used   Substance and Sexual Activity     Alcohol use: Yes     Alcohol/week: 5.8 standard drinks     Types: 7 Glasses of wine per week     Drug use: No     Sexual activity: Not on file   Lifestyle     Physical activity     Days per week: Not on file     Minutes per session: Not on file     Stress: Not on file   Relationships     Social connections     Talks on phone: Not on file     Gets together: Not on file     Attends Muslim service: Not on file     Active member of club or organization: Not on file     Attends meetings of clubs or organizations: Not on file     Relationship status: Not on file     Intimate partner violence     Fear of current or ex partner: Not on file     Emotionally abused: Not on file     Physically abused: Not on file     Forced sexual activity: Not on file   Other Topics Concern     Not on file   Social History Narrative     Not on file       Outpatient Encounter Medications as of 6/4/2021   Medication Sig Dispense Refill     calcium-vitamin D (CALTRATE) 600-400 MG-UNIT per tablet Take 1 tablet by mouth 2 times daily         COENZYME Q-10 PO Take 200 mg by mouth daily       Cyanocobalamin (B-12 PO) Take 1,000 mg by mouth every evening Patient does not know strength       cyanocobalamin 1000 MCG/ML injection Inject 1 mL into the muscle every 30 days Patient receives on the first week of each month       ELIQUIS ANTICOAGULANT 2.5 MG tablet        LACTOBACILLUS PO Take 1 capsule by mouth 2 times daily       MAGNESIUM OXIDE PO Take 500 mg by mouth At Bedtime        MELATONIN PO Take 3 mg by mouth At Bedtime        multivitamin, therapeutic with minerals (MULTI-VITAMIN) TABS Take 1 tablet by mouth daily       OMEPRAZOLE PO Take 20 mg by mouth 2 times daily (before meals)       PROPRANOLOL HCL PO Take 40 mg by mouth 4 times daily        acetaminophen 650 MG TABS Take 650 mg by mouth every 6 hours (Patient not taking: Reported on 6/4/2021) 100 tablet      CLONAZEPAM PO Take 0.5 mg by mouth daily as needed for anxiety       Ondansetron HCl (ZOFRAN PO) Take 4 mg by mouth every 6 hours as needed for nausea or vomiting       [DISCONTINUED] Fexofenadine HCl (ALLEGRA PO) Take 60 mg by mouth daily Breaks tab in 1/2 and takes 1/2 tab twice daily of single daily dose tab.       [DISCONTINUED] metoclopramide (REGLAN) 5 MG tablet Take 1 tablet (5 mg) by mouth 3 times daily as needed 20 tablet 0     [DISCONTINUED] oxyCODONE (ROXICODONE) 5 MG immediate release tablet Take 1 tablet (5 mg) by mouth every 3 hours as needed for moderate to severe pain (Patient not taking: Reported on 6/17/2020) 100 tablet 0     No facility-administered encounter medications on file as of 6/4/2021.        Review Of Systems:  Skin: spots  Eyes: negative  Ears/Nose/Throat: negative  Respiratory: No shortness of breath, dyspnea on exertion, cough, or hemoptysis  Cardiovascular: negative  Gastrointestinal: negative  Genitourinary: negative  Musculoskeletal: negative  Neurologic: negative  Psychiatric: negative  Hematologic/Lymphatic/Immunologic: negative  Endocrine:  negative      Objective:     BP (!) 138/92   Eyes: Conjunctivae/lids: Normal   ENT: Lips:  Normal  MSK: Normal  Cardiovascular: Peripheral edema none  Pulm: Breathing Normal  Neuro/Psych: Orientation: A/O x 3. Normal; Mood/Affect: Normal, NAD, WDWN  Pt accompanied by: self  Following areas examined: Scalp, face, eyelids, lips, neck, chest, abdomen, back, and R&L upper and lower extremities. Pt defers exam of buttock, hips, groin and genitals.   Elkins skin type:i   Nodes:Submental, maxillary, cervical, supraclavicular, post/pre auricular, axillary, antecubital, inguinal, and popliteal NLAD.     Findings:  Red smooth well-defined macules on trunk and extremities.  Brown, stuck-on scaly appearing papules on back, trunk and extremities.  Well circumscribed macules with symmetric color distribution on trunk and extremities.  Tan WD smooth macules on face, neck, trunk, and extremities.    Assessment and Plan:     1) Cherry angiomas, Seborrheic keratoses, Benign nevi, Lentigines     I discussed the specifics of tumor, prognosis, and genetics of benign lesions.  I explained that treatment of these lesions would be purely cosmetic and not medically neccessary.  I discussed with patient different removal options including excision, cryotherapy, cautery and /or laser.  Lesion may recur and/or may not completely resolve. May need additional treatment.     2) history of LM, NMSC and Atypical intraepidermal melanocytic proliferation  LM left upper back 2017 and BCC thigh and ankle.   Left medial breast Atypical intraepidermal melanocytic proliferation excised 1/6/2020.  No evidence of recurrence  Signs and Symptoms of non-melanoma skin cancer and ABCDEs of melanoma reviewed with patient. Patient encouraged to perform monthly self skin exams and educated on how to perform them. UV precautions reviewed with patient. Patient was asked about new or changing moles/lesions on body.   Wear a sunscreen with at least SPF 30 on your  face, ears, neck and V of the chest daily. Wear sunscreen on other areas of the body if those areas are exposed to the sun throughout the day. Sunscreens can contain physical and/or chemical blockers. Physical blockers are less likely to clog pores, these include zinc oxide and titanium dioxide. Reapply every two hour and after swimming. Sunscreen examples include Neutrogena, CeraVe, Blue Jacy, Elta MD and many others.    Proper skin care from [facility]:    -Eliminate harsh soaps as they strip the natural oils from the skin, often resulting in dry itchy skin ( i.e. Dial, Zest, Abimbola Spring)  -Use mild soaps such as Cetaphil or Dove Sensitive Skin in the shower. You do not need to use soap on arms, legs, and trunk every time you shower unless visibly soiled.   -Avoid hot or cold showers.  -After showering, lightly dry off and apply moisturizing within 2-3 minutes. This will help trap moisture in the skin.   -Aggressive use of a moisturizer at least 1-2 times a day to the entire body (including -Vanicream, Cetaphil, Aquaphor or Cerave) and moisturize hands after every washing.  -We recommend using moisturizers that come in a tub that needs to be scooped out, not a pump. This has more of an oil base. It will hold moisture in your skin much better than a water base moisturizer. The above recommended are non-pore clogging.               Follow up in 6 month fbe        Again, thank you for allowing me to participate in the care of your patient.        Sincerely,        Dee Diana PA-C